# Patient Record
Sex: FEMALE | Race: WHITE | Employment: FULL TIME
[De-identification: names, ages, dates, MRNs, and addresses within clinical notes are randomized per-mention and may not be internally consistent; named-entity substitution may affect disease eponyms.]

---

## 2017-02-15 DIAGNOSIS — I10 ESSENTIAL HYPERTENSION: ICD-10-CM

## 2017-02-15 RX ORDER — VALSARTAN 80 MG/1
TABLET ORAL
Qty: 30 TABLET | Refills: 0 | Status: SHIPPED | OUTPATIENT
Start: 2017-02-15 | End: 2017-02-22 | Stop reason: SDUPTHER

## 2017-02-22 ENCOUNTER — OFFICE VISIT (OUTPATIENT)
Dept: FAMILY MEDICINE CLINIC | Facility: CLINIC | Age: 52
End: 2017-02-22

## 2017-02-22 VITALS — HEIGHT: 64 IN | DIASTOLIC BLOOD PRESSURE: 83 MMHG | SYSTOLIC BLOOD PRESSURE: 124 MMHG | HEART RATE: 71 BPM

## 2017-02-22 DIAGNOSIS — I10 ESSENTIAL HYPERTENSION: ICD-10-CM

## 2017-02-22 DIAGNOSIS — E03.9 ACQUIRED HYPOTHYROIDISM: Primary | ICD-10-CM

## 2017-02-22 PROCEDURE — G8427 DOCREV CUR MEDS BY ELIG CLIN: HCPCS | Performed by: FAMILY MEDICINE

## 2017-02-22 PROCEDURE — 1036F TOBACCO NON-USER: CPT | Performed by: FAMILY MEDICINE

## 2017-02-22 PROCEDURE — G8484 FLU IMMUNIZE NO ADMIN: HCPCS | Performed by: FAMILY MEDICINE

## 2017-02-22 PROCEDURE — 3014F SCREEN MAMMO DOC REV: CPT | Performed by: FAMILY MEDICINE

## 2017-02-22 PROCEDURE — G8419 CALC BMI OUT NRM PARAM NOF/U: HCPCS | Performed by: FAMILY MEDICINE

## 2017-02-22 PROCEDURE — 99214 OFFICE O/P EST MOD 30 MIN: CPT | Performed by: FAMILY MEDICINE

## 2017-02-22 RX ORDER — NEBIVOLOL 5 MG/1
TABLET ORAL
Qty: 90 TABLET | Refills: 1 | Status: SHIPPED | OUTPATIENT
Start: 2017-02-22 | End: 2017-07-26 | Stop reason: SDUPTHER

## 2017-02-22 RX ORDER — VALSARTAN 80 MG/1
TABLET ORAL
Qty: 90 TABLET | Refills: 1 | Status: SHIPPED | OUTPATIENT
Start: 2017-02-22 | End: 2017-07-26 | Stop reason: SDUPTHER

## 2017-04-11 ENCOUNTER — TELEPHONE (OUTPATIENT)
Dept: FAMILY MEDICINE CLINIC | Age: 52
End: 2017-04-11

## 2017-04-11 RX ORDER — MOMETASONE FUROATE 50 UG/1
2 SPRAY, METERED NASAL 2 TIMES DAILY
Qty: 1 INHALER | Refills: 11 | Status: SHIPPED | OUTPATIENT
Start: 2017-04-11 | End: 2018-06-16 | Stop reason: SDUPTHER

## 2017-05-31 ENCOUNTER — TELEPHONE (OUTPATIENT)
Dept: FAMILY MEDICINE CLINIC | Age: 52
End: 2017-05-31

## 2017-05-31 RX ORDER — AZELASTINE HYDROCHLORIDE, FLUTICASONE PROPIONATE 137; 50 UG/1; UG/1
1 SPRAY, METERED NASAL 2 TIMES DAILY
Qty: 1 BOTTLE | Refills: 11 | Status: SHIPPED | OUTPATIENT
Start: 2017-05-31 | End: 2018-10-23

## 2017-05-31 RX ORDER — LEVOCETIRIZINE DIHYDROCHLORIDE 5 MG/1
5 TABLET, FILM COATED ORAL DAILY
Qty: 30 TABLET | Refills: 11 | Status: SHIPPED | OUTPATIENT
Start: 2017-05-31 | End: 2018-10-23

## 2017-07-26 ENCOUNTER — OFFICE VISIT (OUTPATIENT)
Dept: FAMILY MEDICINE CLINIC | Age: 52
End: 2017-07-26
Payer: COMMERCIAL

## 2017-07-26 VITALS
HEIGHT: 64 IN | WEIGHT: 160 LBS | HEART RATE: 76 BPM | DIASTOLIC BLOOD PRESSURE: 86 MMHG | BODY MASS INDEX: 27.31 KG/M2 | RESPIRATION RATE: 16 BRPM | SYSTOLIC BLOOD PRESSURE: 132 MMHG

## 2017-07-26 DIAGNOSIS — Z13.9 SCREENING: ICD-10-CM

## 2017-07-26 DIAGNOSIS — Z00.00 WELL ADULT EXAM: Primary | ICD-10-CM

## 2017-07-26 DIAGNOSIS — I10 ESSENTIAL HYPERTENSION: ICD-10-CM

## 2017-07-26 PROCEDURE — 3014F SCREEN MAMMO DOC REV: CPT | Performed by: FAMILY MEDICINE

## 2017-07-26 PROCEDURE — 1036F TOBACCO NON-USER: CPT | Performed by: FAMILY MEDICINE

## 2017-07-26 PROCEDURE — 99214 OFFICE O/P EST MOD 30 MIN: CPT | Performed by: FAMILY MEDICINE

## 2017-07-26 PROCEDURE — 3017F COLORECTAL CA SCREEN DOC REV: CPT | Performed by: FAMILY MEDICINE

## 2017-07-26 PROCEDURE — G8419 CALC BMI OUT NRM PARAM NOF/U: HCPCS | Performed by: FAMILY MEDICINE

## 2017-07-26 PROCEDURE — G8427 DOCREV CUR MEDS BY ELIG CLIN: HCPCS | Performed by: FAMILY MEDICINE

## 2017-07-26 RX ORDER — NEBIVOLOL 5 MG/1
TABLET ORAL
Qty: 90 TABLET | Refills: 1 | Status: SHIPPED | OUTPATIENT
Start: 2017-07-26 | End: 2017-12-28 | Stop reason: SDUPTHER

## 2017-07-26 RX ORDER — VALSARTAN 80 MG/1
TABLET ORAL
Qty: 90 TABLET | Refills: 1 | Status: SHIPPED | OUTPATIENT
Start: 2017-07-26 | End: 2017-09-25 | Stop reason: SDUPTHER

## 2017-08-14 RX ORDER — LEVOTHYROXINE SODIUM 100 MCG
TABLET ORAL
Qty: 30 TABLET | Refills: 0 | Status: SHIPPED | OUTPATIENT
Start: 2017-08-14 | End: 2017-09-14 | Stop reason: SDUPTHER

## 2017-08-15 ENCOUNTER — OFFICE VISIT (OUTPATIENT)
Dept: FAMILY MEDICINE CLINIC | Age: 52
End: 2017-08-15
Payer: COMMERCIAL

## 2017-08-15 VITALS
WEIGHT: 160.05 LBS | HEIGHT: 64 IN | SYSTOLIC BLOOD PRESSURE: 138 MMHG | RESPIRATION RATE: 16 BRPM | DIASTOLIC BLOOD PRESSURE: 78 MMHG | BODY MASS INDEX: 27.33 KG/M2 | HEART RATE: 72 BPM

## 2017-08-15 DIAGNOSIS — J06.9 ACUTE URI: Primary | ICD-10-CM

## 2017-08-15 PROCEDURE — G8419 CALC BMI OUT NRM PARAM NOF/U: HCPCS | Performed by: FAMILY MEDICINE

## 2017-08-15 PROCEDURE — 99213 OFFICE O/P EST LOW 20 MIN: CPT | Performed by: FAMILY MEDICINE

## 2017-08-15 PROCEDURE — 3014F SCREEN MAMMO DOC REV: CPT | Performed by: FAMILY MEDICINE

## 2017-08-15 PROCEDURE — 1036F TOBACCO NON-USER: CPT | Performed by: FAMILY MEDICINE

## 2017-08-15 PROCEDURE — G8427 DOCREV CUR MEDS BY ELIG CLIN: HCPCS | Performed by: FAMILY MEDICINE

## 2017-08-15 PROCEDURE — 3017F COLORECTAL CA SCREEN DOC REV: CPT | Performed by: FAMILY MEDICINE

## 2017-08-15 ASSESSMENT — PATIENT HEALTH QUESTIONNAIRE - PHQ9
SUM OF ALL RESPONSES TO PHQ QUESTIONS 1-9: 0
SUM OF ALL RESPONSES TO PHQ9 QUESTIONS 1 & 2: 0
1. LITTLE INTEREST OR PLEASURE IN DOING THINGS: 0
2. FEELING DOWN, DEPRESSED OR HOPELESS: 0

## 2017-09-14 ENCOUNTER — HOSPITAL ENCOUNTER (OUTPATIENT)
Age: 52
Discharge: HOME | End: 2017-09-14
Payer: COMMERCIAL

## 2017-09-14 DIAGNOSIS — Z00.00 WELL ADULT EXAM: ICD-10-CM

## 2017-09-14 LAB
ABSOLUTE EOS #: 0.2 K/UL (ref 0–0.4)
ABSOLUTE LYMPH #: 1.4 K/UL (ref 1–4.8)
ABSOLUTE MONO #: 0.5 K/UL (ref 0.1–1.2)
ALBUMIN SERPL-MCNC: 4.2 G/DL (ref 3.5–5.2)
ALBUMIN/GLOBULIN RATIO: 1.3 (ref 1–2.5)
ALP BLD-CCNC: 81 U/L (ref 35–104)
ALT SERPL-CCNC: 12 U/L (ref 5–33)
ANION GAP SERPL CALCULATED.3IONS-SCNC: 12 MMOL/L (ref 9–17)
AST SERPL-CCNC: 17 U/L
BASOPHILS # BLD: 1 %
BASOPHILS ABSOLUTE: 0.1 K/UL (ref 0–0.2)
BILIRUB SERPL-MCNC: 0.24 MG/DL (ref 0.3–1.2)
BUN BLDV-MCNC: 14 MG/DL (ref 6–20)
BUN/CREAT BLD: ABNORMAL (ref 9–20)
CALCIUM SERPL-MCNC: 9.3 MG/DL (ref 8.6–10.4)
CHLORIDE BLD-SCNC: 101 MMOL/L (ref 98–107)
CHOLESTEROL/HDL RATIO: 2.3
CHOLESTEROL: 178 MG/DL
CO2: 25 MMOL/L (ref 20–31)
CREAT SERPL-MCNC: 0.61 MG/DL (ref 0.5–0.9)
DIFFERENTIAL TYPE: NORMAL
EOSINOPHILS RELATIVE PERCENT: 3 %
GFR AFRICAN AMERICAN: >60 ML/MIN
GFR NON-AFRICAN AMERICAN: >60 ML/MIN
GFR SERPL CREATININE-BSD FRML MDRD: ABNORMAL ML/MIN/{1.73_M2}
GFR SERPL CREATININE-BSD FRML MDRD: ABNORMAL ML/MIN/{1.73_M2}
GLUCOSE BLD-MCNC: 92 MG/DL (ref 70–99)
HCT VFR BLD CALC: 41.2 % (ref 36–46)
HDLC SERPL-MCNC: 79 MG/DL
HEMOGLOBIN: 14.1 G/DL (ref 12–16)
HIV AG/AB: NONREACTIVE
LDL CHOLESTEROL: 88 MG/DL (ref 0–130)
LYMPHOCYTES # BLD: 26 %
MCH RBC QN AUTO: 31.2 PG (ref 26–34)
MCHC RBC AUTO-ENTMCNC: 34.2 G/DL (ref 31–37)
MCV RBC AUTO: 91.2 FL (ref 80–100)
MONOCYTES # BLD: 10 %
PDW BLD-RTO: 13.7 % (ref 12.5–15.4)
PLATELET # BLD: 346 K/UL (ref 140–450)
PLATELET ESTIMATE: NORMAL
PMV BLD AUTO: 9.6 FL (ref 6–12)
POTASSIUM SERPL-SCNC: 4.9 MMOL/L (ref 3.7–5.3)
RBC # BLD: 4.52 M/UL (ref 4–5.2)
RBC # BLD: NORMAL 10*6/UL
SEG NEUTROPHILS: 60 %
SEGMENTED NEUTROPHILS ABSOLUTE COUNT: 3.4 K/UL (ref 1.8–7.7)
SODIUM BLD-SCNC: 138 MMOL/L (ref 135–144)
THYROXINE, FREE: 1.19 NG/DL (ref 0.93–1.7)
TOTAL PROTEIN: 7.5 G/DL (ref 6.4–8.3)
TRIGL SERPL-MCNC: 53 MG/DL
TSH SERPL DL<=0.05 MIU/L-ACNC: 0.39 MIU/L (ref 0.3–5)
VLDLC SERPL CALC-MCNC: NORMAL MG/DL (ref 1–30)
WBC # BLD: 5.6 K/UL (ref 3.5–11)
WBC # BLD: NORMAL 10*3/UL

## 2017-09-14 PROCEDURE — 80061 LIPID PANEL: CPT

## 2017-09-14 PROCEDURE — 84439 ASSAY OF FREE THYROXINE: CPT

## 2017-09-14 PROCEDURE — 87389 HIV-1 AG W/HIV-1&-2 AB AG IA: CPT

## 2017-09-14 PROCEDURE — 85025 COMPLETE CBC W/AUTO DIFF WBC: CPT

## 2017-09-14 PROCEDURE — 80053 COMPREHEN METABOLIC PANEL: CPT

## 2017-09-14 PROCEDURE — 84443 ASSAY THYROID STIM HORMONE: CPT

## 2017-09-14 PROCEDURE — 36415 COLL VENOUS BLD VENIPUNCTURE: CPT

## 2017-09-14 PROCEDURE — 86376 MICROSOMAL ANTIBODY EACH: CPT

## 2017-09-14 RX ORDER — LEVOTHYROXINE SODIUM 0.1 MG/1
TABLET ORAL
Qty: 30 TABLET | Refills: 0 | Status: CANCELLED | OUTPATIENT
Start: 2017-09-14

## 2017-09-14 RX ORDER — LEVOTHYROXINE SODIUM 0.1 MG/1
100 TABLET ORAL DAILY
Qty: 30 TABLET | Refills: 0 | Status: SHIPPED | OUTPATIENT
Start: 2017-09-14 | End: 2017-09-15 | Stop reason: SDUPTHER

## 2017-09-14 NOTE — TELEPHONE ENCOUNTER
All this patient and tell her I'm still waiting for her thyroid to come back as soon as it comes back today I will send in her dose of medicine I don't want to send in the wrong dose for her.   I have of forgot about her I keep checking for her labs

## 2017-09-15 LAB — THYROID PEROXIDASE (TPO) AB: 28.5 IU/ML (ref 0–35)

## 2017-09-15 RX ORDER — LEVOTHYROXINE SODIUM 0.1 MG/1
100 TABLET ORAL DAILY
Qty: 90 TABLET | Refills: 3 | Status: SHIPPED | OUTPATIENT
Start: 2017-09-15 | End: 2018-09-18 | Stop reason: SDUPTHER

## 2017-09-25 DIAGNOSIS — I10 ESSENTIAL HYPERTENSION: ICD-10-CM

## 2017-09-25 RX ORDER — VALSARTAN 80 MG/1
TABLET ORAL
Qty: 90 TABLET | Refills: 1 | Status: SHIPPED | OUTPATIENT
Start: 2017-09-25 | End: 2018-03-29 | Stop reason: SDUPTHER

## 2017-12-28 DIAGNOSIS — I10 ESSENTIAL HYPERTENSION: ICD-10-CM

## 2017-12-28 RX ORDER — NEBIVOLOL 5 MG/1
TABLET ORAL
Qty: 90 TABLET | Refills: 1 | Status: SHIPPED | OUTPATIENT
Start: 2017-12-28 | End: 2018-03-29 | Stop reason: SDUPTHER

## 2018-02-19 ENCOUNTER — OFFICE VISIT (OUTPATIENT)
Dept: FAMILY MEDICINE CLINIC | Age: 53
End: 2018-02-19
Payer: COMMERCIAL

## 2018-02-19 VITALS
RESPIRATION RATE: 16 BRPM | BODY MASS INDEX: 28.17 KG/M2 | HEART RATE: 62 BPM | SYSTOLIC BLOOD PRESSURE: 152 MMHG | WEIGHT: 165 LBS | DIASTOLIC BLOOD PRESSURE: 90 MMHG | HEIGHT: 64 IN

## 2018-02-19 DIAGNOSIS — J06.9 ACUTE URI: Primary | ICD-10-CM

## 2018-02-19 PROCEDURE — 1036F TOBACCO NON-USER: CPT | Performed by: FAMILY MEDICINE

## 2018-02-19 PROCEDURE — 3017F COLORECTAL CA SCREEN DOC REV: CPT | Performed by: FAMILY MEDICINE

## 2018-02-19 PROCEDURE — 99213 OFFICE O/P EST LOW 20 MIN: CPT | Performed by: FAMILY MEDICINE

## 2018-02-19 PROCEDURE — G8419 CALC BMI OUT NRM PARAM NOF/U: HCPCS | Performed by: FAMILY MEDICINE

## 2018-02-19 PROCEDURE — G8484 FLU IMMUNIZE NO ADMIN: HCPCS | Performed by: FAMILY MEDICINE

## 2018-02-19 PROCEDURE — G8427 DOCREV CUR MEDS BY ELIG CLIN: HCPCS | Performed by: FAMILY MEDICINE

## 2018-02-19 PROCEDURE — 3014F SCREEN MAMMO DOC REV: CPT | Performed by: FAMILY MEDICINE

## 2018-02-19 RX ORDER — AZITHROMYCIN 250 MG/1
TABLET, FILM COATED ORAL
Qty: 1 PACKET | Refills: 0 | Status: SHIPPED | OUTPATIENT
Start: 2018-02-19 | End: 2018-10-23

## 2018-02-19 ASSESSMENT — PATIENT HEALTH QUESTIONNAIRE - PHQ9
SUM OF ALL RESPONSES TO PHQ9 QUESTIONS 1 & 2: 0
1. LITTLE INTEREST OR PLEASURE IN DOING THINGS: 0
SUM OF ALL RESPONSES TO PHQ QUESTIONS 1-9: 0
2. FEELING DOWN, DEPRESSED OR HOPELESS: 0

## 2018-02-19 NOTE — PROGRESS NOTES
Value   09/14/2017 14     CREATININE (mg/dL)   Date Value   09/14/2017 0.61     Glucose (mg/dL)   Date Value   09/14/2017 92              Subjective:      HPI  She is here today complaining of having a cough congestion going on for the last week or so she is taking multiple over-the-counter various cold preparations which seem to be helping    Review of Systems:     Constitutional: Negative for fever, appetite change and fatigue. Family social and medical history reviewed and unchanged     HENT: Negative. Negative for nosebleeds, trouble swallowing and neck pain. Eyes: Negative for photophobia and visual disturbance. Respiratory: Negative. Negative for chest tightness and shortness of breath. Cardiovascular: Negative. Negative for chest pain and leg swelling. Gastrointestinal: Negative. Negative for abdominal pain and blood in stool. Endocrine: Negative for cold intolerance and polyuria. Genitourinary: Negative for dysuria and hematuria. Musculoskeletal: Negative. Skin: Negative for rash. Allergic/Immunologic: Negative. Neurological: Negative. Negative for dizziness, weakness and numbness. Hematological: Negative. Negative for adenopathy. Does not bruise/bleed easily. Psychiatric/Behavioral: Negative for sleep disturbance, dysphoric mood and  decreased concentration. The patient is not nervous/anxious. Objective:     Physical Exam:     Nursing note and vitals reviewed. BP (!) 152/90   Pulse 62   Resp 16   Ht 5' 4.17\" (1.63 m)   Wt 165 lb (74.8 kg)   Breastfeeding? No   BMI 28.17 kg/m²   Constitutional: She is oriented to person, place, and time. She   appears well-developed and well-nourished. HENT:   Head: Normocephalic and atraumatic. Right Ear: External ear normal. Tympanic membrane is not erythematous. No middle ear effusion. Left Ear: External ear normal. Tympanic membrane is not erythematous. No middle ear effusion. Nose: No mucosal edema.

## 2018-03-29 DIAGNOSIS — I10 ESSENTIAL HYPERTENSION: ICD-10-CM

## 2018-03-29 RX ORDER — VALSARTAN 80 MG/1
TABLET ORAL
Qty: 90 TABLET | Refills: 1 | Status: SHIPPED | OUTPATIENT
Start: 2018-03-29 | End: 2018-10-23

## 2018-03-29 RX ORDER — NEBIVOLOL 5 MG/1
TABLET ORAL
Qty: 90 TABLET | Refills: 1 | Status: SHIPPED | OUTPATIENT
Start: 2018-03-29 | End: 2018-10-23 | Stop reason: SDUPTHER

## 2018-06-18 RX ORDER — MOMETASONE FUROATE 50 UG/1
SPRAY, METERED NASAL
Qty: 1 INHALER | Refills: 9 | Status: SHIPPED | OUTPATIENT
Start: 2018-06-18 | End: 2019-07-03 | Stop reason: SDUPTHER

## 2018-10-17 RX ORDER — LEVOTHYROXINE SODIUM 100 MCG
TABLET ORAL
Qty: 30 TABLET | Refills: 0 | Status: SHIPPED | OUTPATIENT
Start: 2018-10-17 | End: 2018-11-19 | Stop reason: SDUPTHER

## 2018-10-23 ENCOUNTER — OFFICE VISIT (OUTPATIENT)
Dept: FAMILY MEDICINE CLINIC | Age: 53
End: 2018-10-23
Payer: COMMERCIAL

## 2018-10-23 VITALS
WEIGHT: 165 LBS | RESPIRATION RATE: 16 BRPM | BODY MASS INDEX: 28.17 KG/M2 | SYSTOLIC BLOOD PRESSURE: 154 MMHG | DIASTOLIC BLOOD PRESSURE: 89 MMHG | HEIGHT: 64 IN | HEART RATE: 76 BPM

## 2018-10-23 DIAGNOSIS — I10 ESSENTIAL HYPERTENSION: Primary | ICD-10-CM

## 2018-10-23 DIAGNOSIS — E03.9 ACQUIRED HYPOTHYROIDISM: ICD-10-CM

## 2018-10-23 DIAGNOSIS — Z12.11 SCREENING FOR COLON CANCER: ICD-10-CM

## 2018-10-23 DIAGNOSIS — J30.1 SEASONAL ALLERGIC RHINITIS DUE TO POLLEN: ICD-10-CM

## 2018-10-23 DIAGNOSIS — Z00.00 WELL ADULT EXAM: ICD-10-CM

## 2018-10-23 PROCEDURE — 3017F COLORECTAL CA SCREEN DOC REV: CPT | Performed by: FAMILY MEDICINE

## 2018-10-23 PROCEDURE — G8427 DOCREV CUR MEDS BY ELIG CLIN: HCPCS | Performed by: FAMILY MEDICINE

## 2018-10-23 PROCEDURE — G8419 CALC BMI OUT NRM PARAM NOF/U: HCPCS | Performed by: FAMILY MEDICINE

## 2018-10-23 PROCEDURE — G8484 FLU IMMUNIZE NO ADMIN: HCPCS | Performed by: FAMILY MEDICINE

## 2018-10-23 PROCEDURE — 1036F TOBACCO NON-USER: CPT | Performed by: FAMILY MEDICINE

## 2018-10-23 PROCEDURE — 99214 OFFICE O/P EST MOD 30 MIN: CPT | Performed by: FAMILY MEDICINE

## 2018-10-23 RX ORDER — LOSARTAN POTASSIUM 100 MG/1
100 TABLET ORAL DAILY
Qty: 30 TABLET | Refills: 5 | Status: SHIPPED | OUTPATIENT
Start: 2018-10-23 | End: 2019-04-28 | Stop reason: SDUPTHER

## 2018-10-23 RX ORDER — NEBIVOLOL 5 MG/1
TABLET ORAL
Qty: 90 TABLET | Refills: 1 | Status: SHIPPED | OUTPATIENT
Start: 2018-10-23 | End: 2019-04-28 | Stop reason: SDUPTHER

## 2018-10-23 ASSESSMENT — PATIENT HEALTH QUESTIONNAIRE - PHQ9
SUM OF ALL RESPONSES TO PHQ QUESTIONS 1-9: 0
SUM OF ALL RESPONSES TO PHQ9 QUESTIONS 1 & 2: 0
1. LITTLE INTEREST OR PLEASURE IN DOING THINGS: 0
SUM OF ALL RESPONSES TO PHQ QUESTIONS 1-9: 0
2. FEELING DOWN, DEPRESSED OR HOPELESS: 0

## 2018-11-16 ENCOUNTER — HOSPITAL ENCOUNTER (OUTPATIENT)
Age: 53
Discharge: HOME | End: 2018-11-16
Payer: COMMERCIAL

## 2018-11-16 DIAGNOSIS — E03.9 ACQUIRED HYPOTHYROIDISM: ICD-10-CM

## 2018-11-16 DIAGNOSIS — J30.1 SEASONAL ALLERGIC RHINITIS DUE TO POLLEN: ICD-10-CM

## 2018-11-16 DIAGNOSIS — Z00.00 WELL ADULT EXAM: ICD-10-CM

## 2018-11-16 DIAGNOSIS — I10 ESSENTIAL HYPERTENSION: ICD-10-CM

## 2018-11-16 LAB
ABSOLUTE EOS #: 0.19 K/UL (ref 0–0.44)
ABSOLUTE IMMATURE GRANULOCYTE: <0.03 K/UL (ref 0–0.3)
ABSOLUTE LYMPH #: 2 K/UL (ref 1.1–3.7)
ABSOLUTE MONO #: 0.8 K/UL (ref 0.1–1.2)
ALBUMIN SERPL-MCNC: 4.4 G/DL (ref 3.5–5.2)
ALBUMIN/GLOBULIN RATIO: 1.4 (ref 1–2.5)
ALP BLD-CCNC: 74 U/L (ref 35–104)
ALT SERPL-CCNC: 22 U/L (ref 5–33)
ANION GAP SERPL CALCULATED.3IONS-SCNC: 12 MMOL/L (ref 9–17)
AST SERPL-CCNC: 21 U/L
BASOPHILS # BLD: 1 % (ref 0–2)
BASOPHILS ABSOLUTE: 0.07 K/UL (ref 0–0.2)
BILIRUB SERPL-MCNC: 0.26 MG/DL (ref 0.3–1.2)
BUN BLDV-MCNC: 20 MG/DL (ref 6–20)
BUN/CREAT BLD: ABNORMAL (ref 9–20)
CALCIUM SERPL-MCNC: 9.6 MG/DL (ref 8.6–10.4)
CHLORIDE BLD-SCNC: 104 MMOL/L (ref 98–107)
CHOLESTEROL/HDL RATIO: 2.1
CHOLESTEROL: 187 MG/DL
CO2: 24 MMOL/L (ref 20–31)
CREAT SERPL-MCNC: 0.72 MG/DL (ref 0.5–0.9)
DIFFERENTIAL TYPE: NORMAL
EOSINOPHILS RELATIVE PERCENT: 3 % (ref 1–4)
GFR AFRICAN AMERICAN: >60 ML/MIN
GFR NON-AFRICAN AMERICAN: >60 ML/MIN
GFR SERPL CREATININE-BSD FRML MDRD: ABNORMAL ML/MIN/{1.73_M2}
GFR SERPL CREATININE-BSD FRML MDRD: ABNORMAL ML/MIN/{1.73_M2}
GLUCOSE BLD-MCNC: 94 MG/DL (ref 70–99)
HCT VFR BLD CALC: 41.3 % (ref 36.3–47.1)
HDLC SERPL-MCNC: 88 MG/DL
HEMOGLOBIN: 13.6 G/DL (ref 11.9–15.1)
IMMATURE GRANULOCYTES: 0 %
LDL CHOLESTEROL: 87 MG/DL (ref 0–130)
LYMPHOCYTES # BLD: 28 % (ref 24–43)
MCH RBC QN AUTO: 30.8 PG (ref 25.2–33.5)
MCHC RBC AUTO-ENTMCNC: 32.9 G/DL (ref 28.4–34.8)
MCV RBC AUTO: 93.7 FL (ref 82.6–102.9)
MONOCYTES # BLD: 11 % (ref 3–12)
NRBC AUTOMATED: 0 PER 100 WBC
PDW BLD-RTO: 13 % (ref 11.8–14.4)
PLATELET # BLD: 318 K/UL (ref 138–453)
PLATELET ESTIMATE: NORMAL
PMV BLD AUTO: 11.2 FL (ref 8.1–13.5)
POTASSIUM SERPL-SCNC: 4.4 MMOL/L (ref 3.7–5.3)
RBC # BLD: 4.41 M/UL (ref 3.95–5.11)
RBC # BLD: NORMAL 10*6/UL
SEG NEUTROPHILS: 57 % (ref 36–65)
SEGMENTED NEUTROPHILS ABSOLUTE COUNT: 4.19 K/UL (ref 1.5–8.1)
SODIUM BLD-SCNC: 140 MMOL/L (ref 135–144)
THYROID PEROXIDASE (TPO) AB: <10 IU/ML (ref 0–35)
THYROXINE, FREE: 1.2 NG/DL (ref 0.93–1.7)
TOTAL PROTEIN: 7.5 G/DL (ref 6.4–8.3)
TRIGL SERPL-MCNC: 61 MG/DL
TSH SERPL DL<=0.05 MIU/L-ACNC: 0.13 MIU/L (ref 0.3–5)
VITAMIN D 25-HYDROXY: 37.7 NG/ML (ref 30–100)
VLDLC SERPL CALC-MCNC: NORMAL MG/DL (ref 1–30)
WBC # BLD: 7.3 K/UL (ref 3.5–11.3)
WBC # BLD: NORMAL 10*3/UL

## 2018-11-16 PROCEDURE — 80061 LIPID PANEL: CPT

## 2018-11-16 PROCEDURE — 84443 ASSAY THYROID STIM HORMONE: CPT

## 2018-11-16 PROCEDURE — 85025 COMPLETE CBC W/AUTO DIFF WBC: CPT

## 2018-11-16 PROCEDURE — 86376 MICROSOMAL ANTIBODY EACH: CPT

## 2018-11-16 PROCEDURE — 80053 COMPREHEN METABOLIC PANEL: CPT

## 2018-11-16 PROCEDURE — 36415 COLL VENOUS BLD VENIPUNCTURE: CPT

## 2018-11-16 PROCEDURE — 84439 ASSAY OF FREE THYROXINE: CPT

## 2018-11-16 PROCEDURE — 82306 VITAMIN D 25 HYDROXY: CPT

## 2018-11-19 RX ORDER — LEVOTHYROXINE SODIUM 100 MCG
TABLET ORAL
Qty: 90 TABLET | Refills: 3 | Status: SHIPPED | OUTPATIENT
Start: 2018-11-19 | End: 2021-11-30 | Stop reason: SDUPTHER

## 2019-05-02 ENCOUNTER — OFFICE VISIT (OUTPATIENT)
Dept: FAMILY MEDICINE CLINIC | Age: 54
End: 2019-05-02
Payer: COMMERCIAL

## 2019-05-02 VITALS
DIASTOLIC BLOOD PRESSURE: 89 MMHG | HEART RATE: 71 BPM | BODY MASS INDEX: 28.17 KG/M2 | RESPIRATION RATE: 16 BRPM | SYSTOLIC BLOOD PRESSURE: 143 MMHG | HEIGHT: 64 IN

## 2019-05-02 DIAGNOSIS — E03.9 ACQUIRED HYPOTHYROIDISM: ICD-10-CM

## 2019-05-02 DIAGNOSIS — I10 ESSENTIAL HYPERTENSION: Primary | ICD-10-CM

## 2019-05-02 PROCEDURE — 3017F COLORECTAL CA SCREEN DOC REV: CPT | Performed by: FAMILY MEDICINE

## 2019-05-02 PROCEDURE — G8419 CALC BMI OUT NRM PARAM NOF/U: HCPCS | Performed by: FAMILY MEDICINE

## 2019-05-02 PROCEDURE — G8427 DOCREV CUR MEDS BY ELIG CLIN: HCPCS | Performed by: FAMILY MEDICINE

## 2019-05-02 PROCEDURE — 99213 OFFICE O/P EST LOW 20 MIN: CPT | Performed by: FAMILY MEDICINE

## 2019-05-02 PROCEDURE — 1036F TOBACCO NON-USER: CPT | Performed by: FAMILY MEDICINE

## 2019-05-02 RX ORDER — OLMESARTAN MEDOXOMIL 40 MG/1
40 TABLET ORAL DAILY
Qty: 30 TABLET | Refills: 3 | Status: SHIPPED | OUTPATIENT
Start: 2019-05-02 | End: 2019-09-23 | Stop reason: SDUPTHER

## 2019-05-02 RX ORDER — NEBIVOLOL 10 MG/1
10 TABLET ORAL DAILY
Qty: 90 TABLET | Refills: 1 | Status: SHIPPED | OUTPATIENT
Start: 2019-05-02 | End: 2019-08-26 | Stop reason: SDUPTHER

## 2019-05-02 RX ORDER — NEBIVOLOL 10 MG/1
10 TABLET ORAL DAILY
Qty: 30 TABLET | Refills: 3 | Status: SHIPPED | OUTPATIENT
Start: 2019-05-02 | End: 2019-05-02 | Stop reason: SDUPTHER

## 2019-05-02 ASSESSMENT — PATIENT HEALTH QUESTIONNAIRE - PHQ9
2. FEELING DOWN, DEPRESSED OR HOPELESS: 0
1. LITTLE INTEREST OR PLEASURE IN DOING THINGS: 0
SUM OF ALL RESPONSES TO PHQ9 QUESTIONS 1 & 2: 0
SUM OF ALL RESPONSES TO PHQ QUESTIONS 1-9: 0
SUM OF ALL RESPONSES TO PHQ QUESTIONS 1-9: 0

## 2019-07-05 RX ORDER — MOMETASONE FUROATE 50 UG/1
SPRAY, METERED NASAL
Qty: 1 EACH | Refills: 11 | Status: SHIPPED | OUTPATIENT
Start: 2019-07-05 | End: 2020-08-24

## 2019-08-07 ENCOUNTER — HOSPITAL ENCOUNTER (OUTPATIENT)
Age: 54
Setting detail: SPECIMEN
Discharge: HOME | End: 2019-08-07
Payer: COMMERCIAL

## 2019-08-07 ENCOUNTER — OFFICE VISIT (OUTPATIENT)
Dept: FAMILY MEDICINE CLINIC | Age: 54
End: 2019-08-07
Payer: COMMERCIAL

## 2019-08-07 VITALS — HEART RATE: 68 BPM | SYSTOLIC BLOOD PRESSURE: 137 MMHG | OXYGEN SATURATION: 99 % | DIASTOLIC BLOOD PRESSURE: 84 MMHG

## 2019-08-07 DIAGNOSIS — E03.9 ACQUIRED HYPOTHYROIDISM: ICD-10-CM

## 2019-08-07 DIAGNOSIS — I10 ESSENTIAL HYPERTENSION: ICD-10-CM

## 2019-08-07 DIAGNOSIS — E03.9 ACQUIRED HYPOTHYROIDISM: Primary | ICD-10-CM

## 2019-08-07 DIAGNOSIS — R00.2 PALPITATION: ICD-10-CM

## 2019-08-07 LAB
THYROXINE, FREE: 1.75 NG/DL (ref 0.93–1.7)
TSH SERPL DL<=0.05 MIU/L-ACNC: 0.11 MIU/L (ref 0.3–5)

## 2019-08-07 PROCEDURE — G8427 DOCREV CUR MEDS BY ELIG CLIN: HCPCS | Performed by: FAMILY MEDICINE

## 2019-08-07 PROCEDURE — 1036F TOBACCO NON-USER: CPT | Performed by: FAMILY MEDICINE

## 2019-08-07 PROCEDURE — G8419 CALC BMI OUT NRM PARAM NOF/U: HCPCS | Performed by: FAMILY MEDICINE

## 2019-08-07 PROCEDURE — 3017F COLORECTAL CA SCREEN DOC REV: CPT | Performed by: FAMILY MEDICINE

## 2019-08-07 PROCEDURE — 99213 OFFICE O/P EST LOW 20 MIN: CPT | Performed by: FAMILY MEDICINE

## 2019-08-07 ASSESSMENT — PATIENT HEALTH QUESTIONNAIRE - PHQ9
2. FEELING DOWN, DEPRESSED OR HOPELESS: 0
SUM OF ALL RESPONSES TO PHQ QUESTIONS 1-9: 0
1. LITTLE INTEREST OR PLEASURE IN DOING THINGS: 0
SUM OF ALL RESPONSES TO PHQ9 QUESTIONS 1 & 2: 0
SUM OF ALL RESPONSES TO PHQ QUESTIONS 1-9: 0

## 2019-08-09 DIAGNOSIS — E03.9 ACQUIRED HYPOTHYROIDISM: Primary | ICD-10-CM

## 2019-08-09 RX ORDER — LEVOTHYROXINE SODIUM 88 UG/1
88 TABLET ORAL DAILY
Qty: 90 TABLET | Refills: 0 | Status: SHIPPED | OUTPATIENT
Start: 2019-08-09 | End: 2019-11-04 | Stop reason: SDUPTHER

## 2019-08-26 ENCOUNTER — TELEPHONE (OUTPATIENT)
Dept: FAMILY MEDICINE CLINIC | Age: 54
End: 2019-08-26

## 2019-08-26 DIAGNOSIS — I10 ESSENTIAL HYPERTENSION: ICD-10-CM

## 2019-08-26 RX ORDER — NEBIVOLOL 20 MG/1
20 TABLET ORAL DAILY
Qty: 90 TABLET | Refills: 1 | Status: SHIPPED | OUTPATIENT
Start: 2019-08-26 | End: 2020-02-25

## 2019-09-23 DIAGNOSIS — I10 ESSENTIAL HYPERTENSION: ICD-10-CM

## 2019-09-23 RX ORDER — OLMESARTAN MEDOXOMIL 40 MG/1
TABLET ORAL
Qty: 30 TABLET | Refills: 2 | Status: SHIPPED | OUTPATIENT
Start: 2019-09-23 | End: 2020-03-24

## 2019-11-04 DIAGNOSIS — E03.9 ACQUIRED HYPOTHYROIDISM: ICD-10-CM

## 2019-11-04 RX ORDER — LEVOTHYROXINE SODIUM 88 MCG
TABLET ORAL
Qty: 30 TABLET | Refills: 0 | Status: SHIPPED | OUTPATIENT
Start: 2019-11-04 | End: 2019-12-05 | Stop reason: SDUPTHER

## 2019-12-03 ENCOUNTER — HOSPITAL ENCOUNTER (OUTPATIENT)
Age: 54
Discharge: HOME | End: 2019-12-03
Payer: COMMERCIAL

## 2019-12-03 DIAGNOSIS — E03.9 ACQUIRED HYPOTHYROIDISM: ICD-10-CM

## 2019-12-03 PROCEDURE — 84443 ASSAY THYROID STIM HORMONE: CPT

## 2019-12-03 PROCEDURE — 84439 ASSAY OF FREE THYROXINE: CPT

## 2019-12-03 PROCEDURE — 36415 COLL VENOUS BLD VENIPUNCTURE: CPT

## 2019-12-04 LAB
THYROXINE, FREE: 1.42 NG/DL (ref 0.93–1.7)
TSH SERPL DL<=0.05 MIU/L-ACNC: 0.23 MIU/L (ref 0.3–5)

## 2019-12-05 ENCOUNTER — TELEPHONE (OUTPATIENT)
Dept: FAMILY MEDICINE CLINIC | Age: 54
End: 2019-12-05

## 2019-12-05 DIAGNOSIS — E03.9 ACQUIRED HYPOTHYROIDISM: ICD-10-CM

## 2019-12-05 RX ORDER — LEVOTHYROXINE SODIUM 88 UG/1
TABLET ORAL
Qty: 90 TABLET | Refills: 3 | Status: SHIPPED | OUTPATIENT
Start: 2019-12-05 | End: 2020-12-02

## 2020-02-25 RX ORDER — NEBIVOLOL HYDROCHLORIDE 20 MG/1
TABLET ORAL
Qty: 30 TABLET | Refills: 0 | Status: SHIPPED | OUTPATIENT
Start: 2020-02-25 | End: 2020-03-24

## 2020-03-24 RX ORDER — OLMESARTAN MEDOXOMIL 40 MG/1
TABLET ORAL
Qty: 30 TABLET | Refills: 1 | Status: SHIPPED | OUTPATIENT
Start: 2020-03-24 | End: 2020-07-28

## 2020-03-24 RX ORDER — NEBIVOLOL HYDROCHLORIDE 20 MG/1
TABLET ORAL
Qty: 30 TABLET | Refills: 0 | Status: SHIPPED | OUTPATIENT
Start: 2020-03-24 | End: 2020-04-24

## 2020-04-24 RX ORDER — NEBIVOLOL HYDROCHLORIDE 20 MG/1
TABLET ORAL
Qty: 30 TABLET | Refills: 0 | Status: SHIPPED | OUTPATIENT
Start: 2020-04-24 | End: 2020-05-28

## 2020-05-28 RX ORDER — NEBIVOLOL HYDROCHLORIDE 20 MG/1
TABLET ORAL
Qty: 30 TABLET | Refills: 0 | Status: SHIPPED | OUTPATIENT
Start: 2020-05-28 | End: 2020-06-29

## 2020-06-29 RX ORDER — NEBIVOLOL HYDROCHLORIDE 20 MG/1
TABLET ORAL
Qty: 30 TABLET | Refills: 0 | Status: SHIPPED | OUTPATIENT
Start: 2020-06-29 | End: 2020-07-28

## 2020-07-28 RX ORDER — OLMESARTAN MEDOXOMIL 40 MG/1
TABLET ORAL
Qty: 30 TABLET | Refills: 0 | Status: SHIPPED | OUTPATIENT
Start: 2020-07-28 | End: 2020-09-29

## 2020-07-28 RX ORDER — NEBIVOLOL HYDROCHLORIDE 20 MG/1
TABLET ORAL
Qty: 30 TABLET | Refills: 0 | Status: SHIPPED | OUTPATIENT
Start: 2020-07-28 | End: 2020-08-31

## 2020-07-28 NOTE — TELEPHONE ENCOUNTER
Anshu Aleman is calling to request a refill on the following medication(s):    Last Visit Date (If Applicable):  4/5/1683    Next Visit Date:    Visit date not found    Medication Request:  Requested Prescriptions     Pending Prescriptions Disp Refills    BYSTOLIC 20 MG TABS tablet [Pharmacy Med Name: BYSTOLIC 20 MG TABLET] 30 tablet 0     Sig: TAKE ONE TABLET BY MOUTH DAILY    olmesartan (BENICAR) 40 MG tablet [Pharmacy Med Name: OLMESARTAN MEDOXOMIL 40 MG TAB] 30 tablet 0     Sig: TAKE ONE TABLET BY MOUTH DAILY

## 2020-08-24 RX ORDER — MOMETASONE FUROATE 50 UG/1
SPRAY, METERED NASAL
Qty: 1 INHALER | Refills: 10 | Status: SHIPPED | OUTPATIENT
Start: 2020-08-24 | End: 2021-08-27

## 2020-08-24 NOTE — TELEPHONE ENCOUNTER
Nirmala Saucedo is calling to request a refill on the following medication(s):    Last Visit Date (If Applicable):  7/4/5310    Next Visit Date:    Visit date not found    Medication Request:  Requested Prescriptions     Pending Prescriptions Disp Refills    mometasone (NASONEX) 50 MCG/ACT nasal spray [Pharmacy Med Name: MOMETASONE FUROATE 50 MCG SPRY] 1 Inhaler 10     Sig: SPRAY TWO SPRAYS IN EACH NOSTRIL TWICE DAILY

## 2020-08-31 RX ORDER — NEBIVOLOL HYDROCHLORIDE 20 MG/1
TABLET ORAL
Qty: 30 TABLET | Refills: 0 | Status: SHIPPED | OUTPATIENT
Start: 2020-08-31 | End: 2020-09-29

## 2020-10-28 ENCOUNTER — OFFICE VISIT (OUTPATIENT)
Dept: FAMILY MEDICINE CLINIC | Age: 55
End: 2020-10-28
Payer: COMMERCIAL

## 2020-10-28 VITALS
HEIGHT: 63 IN | WEIGHT: 160 LBS | SYSTOLIC BLOOD PRESSURE: 173 MMHG | HEART RATE: 70 BPM | DIASTOLIC BLOOD PRESSURE: 93 MMHG | OXYGEN SATURATION: 99 % | BODY MASS INDEX: 28.35 KG/M2 | TEMPERATURE: 96.3 F

## 2020-10-28 PROCEDURE — G8482 FLU IMMUNIZE ORDER/ADMIN: HCPCS | Performed by: FAMILY MEDICINE

## 2020-10-28 PROCEDURE — 90715 TDAP VACCINE 7 YRS/> IM: CPT | Performed by: FAMILY MEDICINE

## 2020-10-28 PROCEDURE — 90472 IMMUNIZATION ADMIN EACH ADD: CPT | Performed by: FAMILY MEDICINE

## 2020-10-28 PROCEDURE — 99396 PREV VISIT EST AGE 40-64: CPT | Performed by: FAMILY MEDICINE

## 2020-10-28 PROCEDURE — 90686 IIV4 VACC NO PRSV 0.5 ML IM: CPT | Performed by: FAMILY MEDICINE

## 2020-10-28 PROCEDURE — 90471 IMMUNIZATION ADMIN: CPT | Performed by: FAMILY MEDICINE

## 2020-10-28 SDOH — ECONOMIC STABILITY: INCOME INSECURITY: HOW HARD IS IT FOR YOU TO PAY FOR THE VERY BASICS LIKE FOOD, HOUSING, MEDICAL CARE, AND HEATING?: NOT HARD AT ALL

## 2020-10-28 SDOH — ECONOMIC STABILITY: FOOD INSECURITY: WITHIN THE PAST 12 MONTHS, YOU WORRIED THAT YOUR FOOD WOULD RUN OUT BEFORE YOU GOT MONEY TO BUY MORE.: NEVER TRUE

## 2020-10-28 SDOH — ECONOMIC STABILITY: FOOD INSECURITY: WITHIN THE PAST 12 MONTHS, THE FOOD YOU BOUGHT JUST DIDN'T LAST AND YOU DIDN'T HAVE MONEY TO GET MORE.: NEVER TRUE

## 2020-10-28 ASSESSMENT — PATIENT HEALTH QUESTIONNAIRE - PHQ9
SUM OF ALL RESPONSES TO PHQ QUESTIONS 1-9: 0
SUM OF ALL RESPONSES TO PHQ9 QUESTIONS 1 & 2: 0
SUM OF ALL RESPONSES TO PHQ QUESTIONS 1-9: 0
1. LITTLE INTEREST OR PLEASURE IN DOING THINGS: 0
2. FEELING DOWN, DEPRESSED OR HOPELESS: 0
SUM OF ALL RESPONSES TO PHQ QUESTIONS 1-9: 0

## 2020-10-28 NOTE — PROGRESS NOTES
[facility]  [address] [place] [address] [address] [facility] [address]  Dept: [phone]      Jacqueline Leija is a 48 y.o. female who presents today for follow up on her  medical conditions as noted below. Chief Complaint   Patient presents with    Medication Refill       Patient Active Problem List:     Hypothyroidism     Allergic rhinitis     Past Medical History:   Diagnosis Date    Allergic rhinitis     Hypertension     Hypothyroidism       Past Surgical History:   Procedure Laterality Date    BREAST SURGERY       No family history on file. Current Outpatient Medications   Medication Sig Dispense Refill    BYSTOLIC 20 MG TABS tablet TAKE ONE TABLET BY MOUTH DAILY 30 tablet 0    olmesartan (BENICAR) 40 MG tablet TAKE ONE TABLET BY MOUTH DAILY (Patient taking differently: 20 mg ) 30 tablet 0    mometasone (NASONEX) 50 MCG/ACT nasal spray SPRAY TWO SPRAYS IN EACH NOSTRIL TWICE DAILY 1 Inhaler 10    levothyroxine (SYNTHROID) 88 MCG tablet TAKE ONE TABLET BY MOUTH EVERY MORNING ON AN EMPTY STOMACH WITH WATER. WAIT 30 MINUTES BEFORE EATING OR TAKING OTHER MEDS 90 tablet 3    ALPRAZolam (XANAX) 1 MG tablet   0    valACYclovir (VALTREX) 500 MG tablet       nebivolol (BYSTOLIC) 5 MG tablet TAKE ONE TABLET BY MOUTH DAILY (Patient not taking: Reported on 10/28/2020) 30 tablet 0    SYNTHROID 100 MCG tablet TAKE ONE TABLET BY MOUTH DAILY (Patient not taking: Reported on 10/28/2020) 90 tablet 3     No current facility-administered medications for this visit.       ALLERGIES:    Allergies   Allergen Reactions    Seasonal        Social History     Tobacco Use    Smoking status: Never Smoker    Smokeless tobacco: Never Used   Substance Use Topics    Alcohol use: Yes     Comment: socially        LDL Cholesterol (mg/dL)   Date Value   11/16/2018 87     HDL (mg/dL)   Date Value   11/16/2018 88     BUN (mg/dL)   Date Value   11/16/2018 20     CREATININE (mg/dL) Date Value   11/16/2018 0.72     Glucose (mg/dL)   Date Value   11/16/2018 94              Subjective:      HPI  She is here today for a well visit follow-up on high blood pressure hypothyroidism she is due for laboratory studies her blood pressure is quite high but she states she is extremely nervous for whatever reason coming in to doctor's offices just freaks her out she states overall she is feeling fine    Review of Systems:     Constitutional: Negative for fever, appetite change and fatigue. Family social and medical history reviewed and unchanged     HENT: Negative. Negative for nosebleeds, trouble swallowing and neck pain. Eyes: Negative for photophobia and visual disturbance. Respiratory: Negative. Negative for chest tightness and shortness of breath. Cardiovascular: Negative. Negative for chest pain and leg swelling. Gastrointestinal: Negative. Negative for abdominal pain and blood in stool. Endocrine: Negative for cold intolerance and polyuria. Genitourinary: Negative for dysuria and hematuria. Musculoskeletal: Negative. Skin: Negative for rash. Allergic/Immunologic: Negative. Neurological: Negative. Negative for dizziness, weakness and numbness. Hematological: Negative. Negative for adenopathy. Does not bruise/bleed easily. Psychiatric/Behavioral: Negative for sleep disturbance, dysphoric mood and  decreased concentration. The patient is not nervous/anxious. Objective:     Physical Exam:     Nursing note and vitals reviewed. BP (!) 173/93   Pulse 70   Temp 96.3 °F (35.7 °C)   Ht 5' 3\" (1.6 m)   Wt 160 lb (72.6 kg)   SpO2 99%   BMI 28.34 kg/m²   Constitutional: She is oriented to person, place, and time. She   appears well-developed and well-nourished. HENT:   Head: Normocephalic and atraumatic. Right Ear: External ear normal. Tympanic membrane is not erythematous. No middle ear effusion.     Left Ear: External ear normal. Tympanic membrane is 4/28/2021    Comprehensive Metabolic Panel     Standing Status:   Future     Standing Expiration Date:   4/28/2021    Lipid Panel     Standing Status:   Future     Standing Expiration Date:   4/28/2021     Order Specific Question:   Is Patient Fasting?/# of Hours     Answer:   yes    T4, Free     Standing Status:   Future     Standing Expiration Date:   4/28/2021    TSH without Reflex     Standing Status:   Future     Standing Expiration Date:   4/28/2021    Vitamin D 25 Hydroxy     Standing Status:   Future     Standing Expiration Date:   10/28/2021     No orders of the defined types were placed in this encounter.   Given influenza and Tdap  Electronically signed by Emir Barroso DO on 10/28/2020 at 5:03 PM

## 2020-10-28 NOTE — PROGRESS NOTES
Vaccine Information Sheet, \"Influenza - Inactivated\"  given to Sheridan Melvin, or parent/legal guardian [facility]  [facility] and verbalized understanding. Patient responses:    Have you ever had a reaction to a flu vaccine? No  Do you have any current illness? No  Have you ever had Guillian [place] Syndrome? No  Do you have a serious allergy to any of the following: Neomycin, Polymyxin, Thimerosal, eggs or egg products? No    Flu vaccine given per order. Please see immunization tab. Risks and benefits explained. Current VIS given.

## 2020-12-30 ENCOUNTER — TELEPHONE (OUTPATIENT)
Dept: FAMILY MEDICINE CLINIC | Age: 55
End: 2020-12-30

## 2020-12-30 NOTE — TELEPHONE ENCOUNTER
Pt blood pressure has been running in 150/90 since she was seen in Oct and wants to know what to do. Pt states she takes olmesartan 40mg 1/2 tab qd and Bystolic 16FW qd.

## 2021-03-04 ENCOUNTER — TELEMEDICINE (OUTPATIENT)
Dept: FAMILY MEDICINE CLINIC | Age: 56
End: 2021-03-04
Payer: COMMERCIAL

## 2021-03-04 DIAGNOSIS — I10 ESSENTIAL HYPERTENSION: Primary | ICD-10-CM

## 2021-03-04 PROCEDURE — G8419 CALC BMI OUT NRM PARAM NOF/U: HCPCS | Performed by: FAMILY MEDICINE

## 2021-03-04 PROCEDURE — G8482 FLU IMMUNIZE ORDER/ADMIN: HCPCS | Performed by: FAMILY MEDICINE

## 2021-03-04 PROCEDURE — G8427 DOCREV CUR MEDS BY ELIG CLIN: HCPCS | Performed by: FAMILY MEDICINE

## 2021-03-04 PROCEDURE — 1036F TOBACCO NON-USER: CPT | Performed by: FAMILY MEDICINE

## 2021-03-04 PROCEDURE — 99213 OFFICE O/P EST LOW 20 MIN: CPT | Performed by: FAMILY MEDICINE

## 2021-03-04 PROCEDURE — 3017F COLORECTAL CA SCREEN DOC REV: CPT | Performed by: FAMILY MEDICINE

## 2021-03-04 RX ORDER — ALISKIREN HEMIFUMARATE AND HYDROCHLOROTHIAZIDE 300; 25 MG/1; MG/1
1 TABLET, FILM COATED ORAL DAILY
Qty: 30 TABLET | Refills: 5 | Status: SHIPPED | OUTPATIENT
Start: 2021-03-04 | End: 2021-08-27

## 2021-03-04 ASSESSMENT — PATIENT HEALTH QUESTIONNAIRE - PHQ9
SUM OF ALL RESPONSES TO PHQ QUESTIONS 1-9: 0
SUM OF ALL RESPONSES TO PHQ QUESTIONS 1-9: 0
2. FEELING DOWN, DEPRESSED OR HOPELESS: 0

## 2021-03-04 NOTE — PROGRESS NOTES
3/4/2021    TELEHEALTH EVALUATION -- Audio/Visual (During XEOWX-59 public health emergency)    HPI:    Leslye Perera (:  1965) has requested an audio/video evaluation for the following concern(s):    She states she is still having extremely high blood pressure readings the addition of olmesartan seem to make absolutely no difference into what her blood pressures are running she is also been under a bit of stress her mom recently passed away    Review of Systems    Prior to Visit Medications    Medication Sig Taking? Authorizing Provider   Aliskiren-hydroCHLOROthiazide (TEKTURNA HCT) 300-25 MG TABS Take 1 tablet by mouth daily Yes Bev Greer DO   levothyroxine (SYNTHROID) 88 MCG tablet TAKE 1 TABLET BY MOUTH EVERY MORNING ON AN EMPTY STOMACH WITH WATER.  WAIT 30 MINUTES BEFORE EATING OR TAKING OTHER MEDS Yes Bev Greer DO   BYSTOLIC 20 MG TABS tablet TAKE ONE TABLET BY MOUTH DAILY Yes Bev Greer DO   olmesartan (BENICAR) 40 MG tablet TAKE ONE TABLET BY MOUTH DAILY Yes Bev Greer DO   mometasone (NASONEX) 50 MCG/ACT nasal spray SPRAY TWO SPRAYS IN EACH NOSTRIL TWICE DAILY Yes Bev Greer DO   ALPRAZolam (XANAX) 1 MG tablet  Yes Historical Provider, MD   valACYclovir (VALTREX) 500 MG tablet  Yes Historical Provider, MD   nebivolol (BYSTOLIC) 5 MG tablet TAKE ONE TABLET BY MOUTH DAILY  Patient not taking: Reported on 10/28/2020  Bev Greer DO   SYNTHROID 100 MCG tablet TAKE ONE TABLET BY MOUTH DAILY  Patient not taking: Reported on 10/28/2020  Bev Greer DO       Social History     Tobacco Use    Smoking status: Never Smoker    Smokeless tobacco: Never Used   Substance Use Topics    Alcohol use: Yes     Comment: socially    Drug use: No        Allergies   Allergen Reactions    Seasonal    ,   Past Medical History:   Diagnosis Date    Allergic rhinitis     Hypertension     Hypothyroidism    ,   Past Surgical History:   Procedure Laterality Date    BREAST SURGERY     , Social History     Tobacco Use    Smoking status: Never Smoker    Smokeless tobacco: Never Used   Substance Use Topics    Alcohol use: Yes     Comment: socially    Drug use: No   , History reviewed. No pertinent family history. PHYSICAL EXAMINATION:  [ INSTRUCTIONS:  \"[x]\" Indicates a positive item  \"[]\" Indicates a negative item  -- DELETE ALL ITEMS NOT EXAMINED]  Vital Signs: (As obtained by patient/caregiver or practitioner observation)    Blood pressure-  Heart rate-    Respiratory rate-    Temperature-  Pulse oximetry-     Constitutional: [x] Appears well-developed and well-nourished [x] No apparent distress      [] Abnormal-   Mental status  [x] Alert and awake  [x] Oriented to person/place/time [x]Able to follow commands      Eyes:  EOM    [x]  Normal  [] Abnormal-  Sclera  [x]  Normal  [] Abnormal -         Discharge [x]  None visible  [] Abnormal -    HENT:   [x] Normocephalic, atraumatic. [] Abnormal   [x] Mouth/Throat: Mucous membranes are moist.     External Ears [x] Normal  [] Abnormal-     Neck: [x] No visualized mass     Pulmonary/Chest: [x] Respiratory effort normal.  [x] No visualized signs of difficulty breathing or respiratory distress        [] Abnormal-      Musculoskeletal:   [x] Normal gait with no signs of ataxia         [x] Normal range of motion of neck        [] Abnormal-       Neurological:        [x] No Facial Asymmetry (Cranial nerve 7 motor function) (limited exam to video visit)          [x] No gaze palsy        [] Abnormal-         Skin:        [x] No significant exanthematous lesions or discoloration noted on facial skin         [] Abnormal-            Psychiatric:       [x] Normal Affect [x] No Hallucinations        [] Abnormal-     Other pertinent observable physical exam findings-     ASSESSMENT/PLAN:   Diagnosis Orders   1. Essential hypertension        No orders of the defined types were placed in this encounter.     Requested Prescriptions Signed Prescriptions Disp Refills    Aliskiren-hydroCHLOROthiazide (TEKTURNA HCT) 300-25 MG TABS 30 tablet 5     Sig: Take 1 tablet by mouth daily     Ele Marienthal     Return if symptoms worsen or fail to improve. Jesus Hassan is a 48 y.o. female being evaluated by a Virtual Visit (video visit) encounter to address concerns as mentioned above. A caregiver was present when appropriate. Due to this being a TeleHealth encounter (During NKACK-71 public health emergency), evaluation of the following organ systems was limited: Vitals/Constitutional/EENT/Resp/CV/GI//MS/Neuro/Skin/Heme-Lymph-Imm. Pursuant to the emergency declaration under the [address] [place] [address], [address] [place] [address] authority and the [employer] Act, this Virtual Visit was conducted with patient's (and/or legal guardian's) consent, to reduce the patient's risk of exposure to COVID-19 and provide necessary medical care. The patient (and/or legal guardian) has also been advised to contact this office for worsening conditions or problems, and seek emergency medical treatment and/or call 911 if deemed necessary. Patient identification was verified at the start of the visit: Yes    Total time spent on this encounter: Not billed by time    Services were provided through a video synchronous discussion virtually to substitute for in-person clinic visit. Patient and provider were located at their individual homes. --Theresa Levin DO on 3/4/2021 at 4:07 PM    An electronic signature was used to authenticate this note.

## 2021-04-28 DIAGNOSIS — I10 ESSENTIAL HYPERTENSION: ICD-10-CM

## 2021-04-28 RX ORDER — NEBIVOLOL HYDROCHLORIDE 20 MG/1
TABLET ORAL
Qty: 30 TABLET | Refills: 0 | Status: SHIPPED | OUTPATIENT
Start: 2021-04-28 | End: 2021-05-24

## 2021-05-24 DIAGNOSIS — I10 ESSENTIAL HYPERTENSION: ICD-10-CM

## 2021-05-24 RX ORDER — NEBIVOLOL HYDROCHLORIDE 20 MG/1
TABLET ORAL
Qty: 30 TABLET | Refills: 3 | Status: SHIPPED | OUTPATIENT
Start: 2021-05-24 | End: 2021-09-23

## 2021-05-24 NOTE — TELEPHONE ENCOUNTER
Calli Elizondo is calling to request a refill on the following medication(s):    Last Visit Date (If Applicable):  6/9/4086    Next Visit Date:    Visit date not found    Medication Request:  Requested Prescriptions     Pending Prescriptions Disp Refills    BYSTOLIC 20 MG TABS tablet [Pharmacy Med Name: BYSTOLIC 20 MG TABLET] 30 tablet 3     Sig: TAKE ONE TABLET BY MOUTH DAILY

## 2021-08-26 NOTE — TELEPHONE ENCOUNTER
Saintclair Linger is calling to request a refill on the following medication(s):    Last Visit Date (If Applicable):  3/7/3488    Next Visit Date:    Visit date not found    Medication Request:  Requested Prescriptions     Pending Prescriptions Disp Refills    TEKTURNA -25 MG TABS [Pharmacy Med Name: Dariela Matar -25 MG TABLET] 30 tablet 5     Sig: TAKE ONE TABLET BY MOUTH DAILY    mometasone (NASONEX) 50 MCG/ACT nasal spray [Pharmacy Med Name: MOMETASONE FUROATE 50 MCG SPRY] 1 Inhaler      Sig: SPRAY TWO SPRAYS IN EACH NOSTRIL TWICE DAILY

## 2021-08-26 NOTE — TELEPHONE ENCOUNTER
----- Message from Jayce Almaraz sent at 8/26/2021  9:40 AM EDT -----  Subject: Refill Request    QUESTIONS  Name of Medication? mometasone (NASONEX) 50 MCG/ACT nasal spray  Patient-reported dosage and instructions? takes every day   How many days do you have left? 0  Preferred Pharmacy? [facility] phone number (if available)? [phone]  Additional Information for Provider? Patient is calling she needs her   allergy medication as she is out. She did talk to the pharmacy and they   were going to reach out to the PCP as well.   ---------------------------------------------------------------------------  --------------  CALL BACK INFO  What is the best way for the office to contact you? OK to leave message on   voicemail  Preferred Call Back Phone Number?  [phone]

## 2021-08-27 RX ORDER — MOMETASONE FUROATE 50 UG/1
SPRAY, METERED NASAL
Qty: 1 INHALER | Refills: 5 | Status: SHIPPED | OUTPATIENT
Start: 2021-08-27 | End: 2022-02-23

## 2021-08-27 RX ORDER — ALISKIREN HEMIFUMARATE AND HYDROCHLOROTHIAZIDE 300; 25 MG/1; MG/1
TABLET, FILM COATED ORAL
Qty: 30 TABLET | Refills: 5 | Status: SHIPPED | OUTPATIENT
Start: 2021-08-27 | End: 2022-03-21

## 2021-09-23 DIAGNOSIS — I10 ESSENTIAL HYPERTENSION: ICD-10-CM

## 2021-09-23 RX ORDER — NEBIVOLOL HYDROCHLORIDE 20 MG/1
TABLET ORAL
Qty: 30 TABLET | Refills: 3 | Status: SHIPPED | OUTPATIENT
Start: 2021-09-23 | End: 2022-01-24

## 2021-09-23 NOTE — TELEPHONE ENCOUNTER
Jeffrey Muñoz is calling to request a refill on the following medication(s):    Last Visit Date (If Applicable):  0/9/3887    Next Visit Date:    Visit date not found    Medication Request:  Requested Prescriptions     Pending Prescriptions Disp Refills    BYSTOLIC 20 MG TABS tablet [Pharmacy Med Name: BYSTOLIC 20 MG TABLET] 30 tablet 3     Sig: take 1 tablet by mouth once daily

## 2021-11-30 NOTE — TELEPHONE ENCOUNTER
Nadya Ruff is calling to request a refill on the following medication(s):    Last Visit Date (If Applicable):  2/1/5434    Next Visit Date:    Visit date not found    Medication Request:  Requested Prescriptions     Pending Prescriptions Disp Refills    levothyroxine (SYNTHROID) 100 MCG tablet 90 tablet 3

## 2021-12-01 RX ORDER — LEVOTHYROXINE SODIUM 0.1 MG/1
TABLET ORAL
Qty: 90 TABLET | Refills: 3 | Status: SHIPPED | OUTPATIENT
Start: 2021-12-01 | End: 2021-12-02 | Stop reason: SDUPTHER

## 2021-12-01 NOTE — TELEPHONE ENCOUNTER
Patient calling for refill of medication. Patient state she completely out of medication.  Please advise

## 2021-12-02 DIAGNOSIS — E03.9 ACQUIRED HYPOTHYROIDISM: ICD-10-CM

## 2021-12-02 RX ORDER — LEVOTHYROXINE SODIUM 0.1 MG/1
TABLET ORAL
Qty: 90 TABLET | Refills: 3 | Status: SHIPPED | OUTPATIENT
Start: 2021-12-02 | End: 2022-07-26

## 2021-12-02 NOTE — TELEPHONE ENCOUNTER
Agnes Worrell is calling to request a refill on the following medication(s):    Last Visit Date (If Applicable):  2/1/9592    Next Visit Date:    Visit date not found    Medication Request:  Requested Prescriptions     Pending Prescriptions Disp Refills    levothyroxine (SYNTHROID) 100 MCG tablet 90 tablet 3     Sig: TAKE ONE TABLET BY MOUTH DAILY

## 2021-12-03 ENCOUNTER — TELEPHONE (OUTPATIENT)
Dept: FAMILY MEDICINE CLINIC | Age: 56
End: 2021-12-03

## 2021-12-03 DIAGNOSIS — E03.9 ACQUIRED HYPOTHYROIDISM: ICD-10-CM

## 2021-12-03 RX ORDER — LEVOTHYROXINE SODIUM 88 UG/1
TABLET ORAL
Qty: 90 TABLET | Refills: 3 | Status: CANCELLED | OUTPATIENT
Start: 2021-12-03

## 2021-12-03 RX ORDER — LEVOTHYROXINE SODIUM 88 UG/1
TABLET ORAL
Qty: 30 TABLET | Refills: 0 | Status: SHIPPED | OUTPATIENT
Start: 2021-12-03 | End: 2021-12-27 | Stop reason: SDUPTHER

## 2021-12-03 NOTE — TELEPHONE ENCOUNTER
----- Message from [facility] sent at 12/2/2021  4:54 PM EST -----  Subject: Medication Problem    QUESTIONS  Name of Medication? levothyroxine (SYNTHROID) 88 MCG tablet  Patient-reported dosage and instructions? TAKE 1 TABLET BY MOUTH EVERY   MORNING ON AN EMPTY STOMACH WITH WATER. WAIT 30 MINUTES BEFORE EATING OR   TAKING OTHER MEDS  What question or problem do you have with the medication? Belinda Quevedo from [place] [address] called they received this script levothyroxine (SYNTHROID) 100 MCG   tablet and it is incorrect. They need the script for levothyroxine   (SYNTHROID) 88 MCG tablet and it has to be the name brand not generic. Preferred Pharmacy? [facility], [address] [address] [phone] [facility] [phone]  Pharmacy phone number (if available)? [phone]  Additional Information for Provider?   ---------------------------------------------------------------------------  --------------  [address]  What is the best way for the office to contact you? OK to leave message on   voicemail  Preferred Call Back Phone Number? [phone]  ---------------------------------------------------------------------------  --------------  SCRIPT ANSWERS  Relationship to Patient? Third Party  Representative Name?  Belinda Quevedo
Not sure who sent the request to me for the Synthroid 100 mcg I just refilled what was sent but I also just noticed the patient has not been seen for a year and she has not done her labs for her thyroid or any labs since 2019 so I will send her a refill for the Synthroid 88 mcg but only for 30 days she needs an appointment and she will need to get lab work done so prepare to come fasting
[facility] [place] requests [facility] for  levothyroxine 88mcg instead of 100mcg. Also [facility] [place].
none

## 2021-12-15 ENCOUNTER — TELEPHONE (OUTPATIENT)
Dept: FAMILY MEDICINE CLINIC | Age: 56
End: 2021-12-15

## 2021-12-15 DIAGNOSIS — E03.9 ACQUIRED HYPOTHYROIDISM: ICD-10-CM

## 2021-12-15 DIAGNOSIS — Z00.00 WELL ADULT EXAM: Primary | ICD-10-CM

## 2021-12-15 DIAGNOSIS — I10 ESSENTIAL HYPERTENSION: ICD-10-CM

## 2021-12-15 NOTE — TELEPHONE ENCOUNTER
She has not done her blood work since 2019 so until she gets it done I am only giving her 30-day supply

## 2021-12-15 NOTE — TELEPHONE ENCOUNTER
----- Message from Jose Benoit sent at 12/15/2021  3:17 PM EST -----  Subject: Medication Problem    QUESTIONS  Name of Medication? levothyroxine (SYNTHROID) 88 MCG tablet  Patient-reported dosage and instructions? once a day  What question or problem do you have with the medication? patient is   wondering why she was only given a 30 day supply of the medication and was   wondering if she would need to schedule a visit to receive the next   refill. If she does, patient would like to schedule that appointment. Preferred Pharmacy? [facility], [address] [facility]   [facility] [phone] [facility] [place] [place] [phone]  Pharmacy phone number (if available)? [phone]  Additional Information for Provider?   ---------------------------------------------------------------------------  --------------  [address]  What is the best way for the office to contact you? OK to leave message on   voicemail  Preferred Call Back Phone Number? [phone]  ---------------------------------------------------------------------------  --------------  SCRIPT ANSWERS  Relationship to Patient?  Self

## 2021-12-21 ENCOUNTER — HOSPITAL ENCOUNTER (OUTPATIENT)
Age: 56
Setting detail: SPECIMEN
Discharge: HOME | End: 2021-12-21

## 2021-12-21 DIAGNOSIS — E03.9 ACQUIRED HYPOTHYROIDISM: ICD-10-CM

## 2021-12-21 DIAGNOSIS — Z00.00 WELL ADULT EXAM: ICD-10-CM

## 2021-12-21 DIAGNOSIS — I10 ESSENTIAL HYPERTENSION: ICD-10-CM

## 2021-12-21 LAB
ABSOLUTE EOS #: 0.1 K/UL (ref 0–0.44)
ABSOLUTE IMMATURE GRANULOCYTE: <0.03 K/UL (ref 0–0.3)
ABSOLUTE LYMPH #: 1.73 K/UL (ref 1.1–3.7)
ABSOLUTE MONO #: 0.69 K/UL (ref 0.1–1.2)
ALBUMIN SERPL-MCNC: 4.7 G/DL (ref 3.5–5.2)
ALBUMIN/GLOBULIN RATIO: 1.5 (ref 1–2.5)
ALP BLD-CCNC: 74 U/L (ref 35–104)
ALT SERPL-CCNC: 19 U/L (ref 5–33)
ANION GAP SERPL CALCULATED.3IONS-SCNC: 16 MMOL/L (ref 9–17)
AST SERPL-CCNC: 22 U/L
BASOPHILS # BLD: 1 % (ref 0–2)
BASOPHILS ABSOLUTE: 0.07 K/UL (ref 0–0.2)
BILIRUB SERPL-MCNC: 0.47 MG/DL (ref 0.3–1.2)
BUN BLDV-MCNC: 20 MG/DL (ref 6–20)
BUN/CREAT BLD: NORMAL (ref 9–20)
CALCIUM SERPL-MCNC: 10.1 MG/DL (ref 8.6–10.4)
CHLORIDE BLD-SCNC: 99 MMOL/L (ref 98–107)
CHOLESTEROL/HDL RATIO: 2.5
CHOLESTEROL: 238 MG/DL
CO2: 24 MMOL/L (ref 20–31)
CREAT SERPL-MCNC: 0.81 MG/DL (ref 0.5–0.9)
DIFFERENTIAL TYPE: ABNORMAL
EOSINOPHILS RELATIVE PERCENT: 2 % (ref 1–4)
GFR AFRICAN AMERICAN: >60 ML/MIN
GFR NON-AFRICAN AMERICAN: >60 ML/MIN
GFR SERPL CREATININE-BSD FRML MDRD: NORMAL ML/MIN/{1.73_M2}
GFR SERPL CREATININE-BSD FRML MDRD: NORMAL ML/MIN/{1.73_M2}
GLUCOSE BLD-MCNC: 92 MG/DL (ref 70–99)
HCT VFR BLD CALC: 44.2 % (ref 36.3–47.1)
HDLC SERPL-MCNC: 96 MG/DL
HEMOGLOBIN: 14.2 G/DL (ref 11.9–15.1)
IMMATURE GRANULOCYTES: 0 %
LDL CHOLESTEROL: 128 MG/DL (ref 0–130)
LYMPHOCYTES # BLD: 33 % (ref 24–43)
MCH RBC QN AUTO: 30.2 PG (ref 25.2–33.5)
MCHC RBC AUTO-ENTMCNC: 32.1 G/DL (ref 28.4–34.8)
MCV RBC AUTO: 94 FL (ref 82.6–102.9)
MONOCYTES # BLD: 13 % (ref 3–12)
NRBC AUTOMATED: 0 PER 100 WBC
PDW BLD-RTO: 12.6 % (ref 11.8–14.4)
PLATELET # BLD: 332 K/UL (ref 138–453)
PLATELET ESTIMATE: ABNORMAL
PMV BLD AUTO: 10.9 FL (ref 8.1–13.5)
POTASSIUM SERPL-SCNC: 4.8 MMOL/L (ref 3.7–5.3)
RBC # BLD: 4.7 M/UL (ref 3.95–5.11)
RBC # BLD: ABNORMAL 10*6/UL
SEG NEUTROPHILS: 51 % (ref 36–65)
SEGMENTED NEUTROPHILS ABSOLUTE COUNT: 2.69 K/UL (ref 1.5–8.1)
SODIUM BLD-SCNC: 139 MMOL/L (ref 135–144)
THYROXINE, FREE: 1.18 NG/DL (ref 0.93–1.7)
TOTAL PROTEIN: 7.9 G/DL (ref 6.4–8.3)
TRIGL SERPL-MCNC: 68 MG/DL
TSH SERPL DL<=0.05 MIU/L-ACNC: 0.2 MIU/L (ref 0.3–5)
VITAMIN D 25-HYDROXY: 38.8 NG/ML (ref 30–100)
VLDLC SERPL CALC-MCNC: ABNORMAL MG/DL (ref 1–30)
WBC # BLD: 5.3 K/UL (ref 3.5–11.3)
WBC # BLD: ABNORMAL 10*3/UL

## 2021-12-27 DIAGNOSIS — E03.9 ACQUIRED HYPOTHYROIDISM: ICD-10-CM

## 2021-12-27 RX ORDER — LEVOTHYROXINE SODIUM 88 UG/1
TABLET ORAL
Qty: 30 TABLET | Refills: 0 | Status: SHIPPED | OUTPATIENT
Start: 2021-12-27 | End: 2022-01-25

## 2021-12-27 RX ORDER — HYDROCHLOROTHIAZIDE 25 MG/1
25 TABLET ORAL EVERY MORNING
Qty: 30 TABLET | Refills: 0 | Status: SHIPPED | OUTPATIENT
Start: 2021-12-27 | End: 2022-07-26

## 2021-12-27 RX ORDER — ALISKIREN 300 MG/1
300 TABLET, FILM COATED ORAL DAILY
Qty: 30 TABLET | Refills: 3 | Status: SHIPPED | OUTPATIENT
Start: 2021-12-27 | End: 2022-07-26

## 2021-12-27 NOTE — TELEPHONE ENCOUNTER
Marcelino Cantu is calling to request a refill on the following medication(s):    Last Visit Date (If Applicable):  0/0/4147    Next Visit Date:    Visit date not found    Medication Request:  Requested Prescriptions     Pending Prescriptions Disp Refills    levothyroxine (SYNTHROID) 88 MCG tablet 30 tablet 0     Sig: TAKE 1 TABLET BY MOUTH EVERY MORNING ON AN EMPTY STOMACH WITH WATER.  WAIT 30 MINUTES BEFORE EATING OR TAKING OTHER MEDS

## 2022-01-23 DIAGNOSIS — I10 ESSENTIAL HYPERTENSION: ICD-10-CM

## 2022-01-24 DIAGNOSIS — E03.9 ACQUIRED HYPOTHYROIDISM: ICD-10-CM

## 2022-01-24 RX ORDER — NEBIVOLOL HYDROCHLORIDE 20 MG/1
TABLET ORAL
Qty: 30 TABLET | Refills: 3 | Status: SHIPPED | OUTPATIENT
Start: 2022-01-24 | End: 2022-05-26

## 2022-01-24 NOTE — TELEPHONE ENCOUNTER
Kishore Dejesus is calling to request a refill on the following medication(s):    Last Visit Date (If Applicable):  8/8/0325    Next Visit Date:    Visit date not found    Medication Request:  Requested Prescriptions     Pending Prescriptions Disp Refills    BYSTOLIC 20 MG TABS tablet [Pharmacy Med Name: BYSTOLIC 20 MG TABLET] 30 tablet 3     Sig: take 1 tablet by mouth once daily

## 2022-01-25 RX ORDER — LEVOTHYROXINE SODIUM 88 UG/1
TABLET ORAL
Qty: 30 TABLET | Refills: 0 | Status: SHIPPED | OUTPATIENT
Start: 2022-01-25 | End: 2022-02-25

## 2022-01-25 NOTE — TELEPHONE ENCOUNTER
[facility]erry [facility] is calling to request a refill on the following medication(s):    Last Visit Date (If Applicable):  Visit date not found    Next Visit Date:    Visit date not found    Medication Request:  Requested Prescriptions     Pending Prescriptions Disp Refills    levothyroxine (SYNTHROID) 88 MCG tablet [Pharmacy Med Name: SYNTHROID 88 MCG TABLET] 30 tablet 0     Sig: take 1 tablet by mouth every morning ON AN EMPTY STOMACH with WATER WAIT 30 MINUTES BEFORE EATING OR TAKING OTHER MEDS

## 2022-02-23 RX ORDER — MOMETASONE FUROATE 50 UG/1
SPRAY, METERED NASAL
Qty: 17 G | Refills: 0 | Status: SHIPPED | OUTPATIENT
Start: 2022-02-23 | End: 2022-03-21

## 2022-02-23 NOTE — TELEPHONE ENCOUNTER
Reena Clayton is calling to request a refill on the following medication(s):    Last Visit Date (If Applicable):  3/7/9306    Next Visit Date:    Visit date not found    Medication Request:  Requested Prescriptions     Pending Prescriptions Disp Refills    mometasone (NASONEX) 50 MCG/ACT nasal spray [Pharmacy Med Name: MOMETASONE FUROATE 50 MCG SPRY] 17 g      Sig: instill 2 sprays into each nostril twice a day

## 2022-02-25 DIAGNOSIS — E03.9 ACQUIRED HYPOTHYROIDISM: ICD-10-CM

## 2022-02-25 RX ORDER — LEVOTHYROXINE SODIUM 88 UG/1
TABLET ORAL
Qty: 30 TABLET | Refills: 0 | Status: SHIPPED | OUTPATIENT
Start: 2022-02-25 | End: 2022-03-21

## 2022-02-25 NOTE — TELEPHONE ENCOUNTER
Cherrie Leo is calling to request a refill on the following medication(s):    Last Visit Date (If Applicable):  1/3/9152    Next Visit Date:    Visit date not found    Medication Request:  Requested Prescriptions     Pending Prescriptions Disp Refills    levothyroxine (SYNTHROID) 88 MCG tablet [Pharmacy Med Name: SYNTHROID 88 MCG TABLET] 30 tablet 0     Sig: take 1 tablet by mouth IN THE MORNING ON AN EMPTY STOMACH with water then WAIT 30 MINUTES before EATING OR TAKING OTHER MEDS

## 2022-03-19 DIAGNOSIS — E03.9 ACQUIRED HYPOTHYROIDISM: ICD-10-CM

## 2022-03-21 RX ORDER — ALISKIREN HEMIFUMARATE AND HYDROCHLOROTHIAZIDE 300; 25 MG/1; MG/1
TABLET, FILM COATED ORAL
Qty: 30 TABLET | Refills: 0 | Status: SHIPPED | OUTPATIENT
Start: 2022-03-21 | End: 2022-04-27

## 2022-03-21 RX ORDER — LEVOTHYROXINE SODIUM 88 UG/1
TABLET ORAL
Qty: 30 TABLET | Refills: 0 | Status: SHIPPED | OUTPATIENT
Start: 2022-03-21 | End: 2022-04-27

## 2022-03-21 RX ORDER — MOMETASONE FUROATE 50 UG/1
SPRAY, METERED NASAL
Qty: 17 G | Refills: 0 | Status: SHIPPED | OUTPATIENT
Start: 2022-03-21 | End: 2022-04-27

## 2022-03-21 NOTE — TELEPHONE ENCOUNTER
Zoila Burden is calling to request a refill on the following medication(s):    Last Visit Date (If Applicable):  6/6/7737    Next Visit Date:    Visit date not found    Medication Request:  Requested Prescriptions     Pending Prescriptions Disp Refills    levothyroxine (SYNTHROID) 88 MCG tablet [Pharmacy Med Name: SYNTHROID 88 MCG TABLET] 30 tablet 0     Sig: take 1 tablet by mouth IN THE MORNING ON AN EMPTY STOMACH with water then WAIT 30 MINUTES before EATING OR TAKING OTHER MEDS    TEKTURNA -25 MG TABS [Pharmacy Med Name: Lucilla Ouch -25 MG TABLET] 30 tablet 5     Sig: take 1 tablet by mouth once daily    mometasone (NASONEX) 50 MCG/ACT nasal spray [Pharmacy Med Name: MOMETASONE FUROATE 50 MCG SPRY] 17 g 0     Sig: instill 2 sprays into each nostril twice a day

## 2022-04-27 DIAGNOSIS — E03.9 ACQUIRED HYPOTHYROIDISM: ICD-10-CM

## 2022-04-27 RX ORDER — MOMETASONE FUROATE 50 UG/1
SPRAY, METERED NASAL
Qty: 17 G | Refills: 0 | Status: SHIPPED | OUTPATIENT
Start: 2022-04-27 | End: 2022-05-27 | Stop reason: SDUPTHER

## 2022-04-27 RX ORDER — ALISKIREN HEMIFUMARATE AND HYDROCHLOROTHIAZIDE 300; 25 MG/1; MG/1
TABLET, FILM COATED ORAL
Qty: 30 TABLET | Refills: 0 | Status: SHIPPED | OUTPATIENT
Start: 2022-04-27 | End: 2022-05-27 | Stop reason: SDUPTHER

## 2022-04-27 RX ORDER — LEVOTHYROXINE SODIUM 88 UG/1
TABLET ORAL
Qty: 30 TABLET | Refills: 0 | Status: SHIPPED | OUTPATIENT
Start: 2022-04-27 | End: 2022-05-27 | Stop reason: SDUPTHER

## 2022-05-26 DIAGNOSIS — I10 ESSENTIAL HYPERTENSION: ICD-10-CM

## 2022-05-26 DIAGNOSIS — E03.9 ACQUIRED HYPOTHYROIDISM: ICD-10-CM

## 2022-05-26 RX ORDER — NEBIVOLOL 20 MG/1
TABLET ORAL
Qty: 30 TABLET | Refills: 3 | Status: SHIPPED | OUTPATIENT
Start: 2022-05-26 | End: 2022-07-26 | Stop reason: SDUPTHER

## 2022-05-26 NOTE — TELEPHONE ENCOUNTER
Zoila Burden is calling to request a refill on the following medication(s):    Last Visit Date (If Applicable):  0/3/1499    Next Visit Date:    Visit date not found    Medication Request:  Requested Prescriptions     Pending Prescriptions Disp Refills    nebivolol (BYSTOLIC) 20 MG TABS tablet [Pharmacy Med Name: NEBIVOLOL 20 MG TABLET] 30 tablet 3     Sig: take 1 tablet by mouth once daily

## 2022-05-27 RX ORDER — LEVOTHYROXINE SODIUM 88 UG/1
TABLET ORAL
Qty: 30 TABLET | Refills: 0 | Status: SHIPPED | OUTPATIENT
Start: 2022-05-27 | End: 2022-06-29

## 2022-05-27 RX ORDER — MOMETASONE FUROATE 50 UG/1
SPRAY, METERED NASAL
Qty: 17 G | Refills: 0 | Status: SHIPPED | OUTPATIENT
Start: 2022-05-27 | End: 2022-06-29

## 2022-05-27 RX ORDER — ALISKIREN HEMIFUMARATE AND HYDROCHLOROTHIAZIDE 300; 25 MG/1; MG/1
TABLET, FILM COATED ORAL
Qty: 30 TABLET | Refills: 0 | Status: SHIPPED | OUTPATIENT
Start: 2022-05-27 | End: 2022-06-29

## 2022-05-27 NOTE — TELEPHONE ENCOUNTER
Jenn Murphy is calling to request a refill on the following medication(s):    Last Visit Date (If Applicable):  8/4/3073    Next Visit Date:    Visit date not found    Medication Request:  Requested Prescriptions     Pending Prescriptions Disp Refills    TEKTURNA -25 MG TABS [Pharmacy Med Name: Vick Sanjay -25 MG TABLET] 30 tablet 0     Sig: take 1 tablet by mouth once daily    levothyroxine (SYNTHROID) 88 MCG tablet [Pharmacy Med Name: SYNTHROID 88 MCG TABLET] 30 tablet 0     Sig: take 1 tablet by mouth IN THE MORNING ON AN EMPTY STOMACH with water then WAIT 30 MINUTES before EATING OR TAKING OTHER MEDS    mometasone (NASONEX) 50 MCG/ACT nasal spray [Pharmacy Med Name: MOMETASONE FUROATE 50 MCG SPRY] 17 g 0     Sig: instill 2 sprays into each nostril twice a day No

## 2022-06-29 DIAGNOSIS — E03.9 ACQUIRED HYPOTHYROIDISM: ICD-10-CM

## 2022-06-29 RX ORDER — LEVOTHYROXINE SODIUM 88 UG/1
TABLET ORAL
Qty: 30 TABLET | Refills: 0 | Status: SHIPPED | OUTPATIENT
Start: 2022-06-29 | End: 2022-07-26

## 2022-06-29 RX ORDER — ALISKIREN HEMIFUMARATE AND HYDROCHLOROTHIAZIDE 300; 25 MG/1; MG/1
TABLET, FILM COATED ORAL
Qty: 30 TABLET | Refills: 0 | Status: SHIPPED | OUTPATIENT
Start: 2022-06-29 | End: 2022-07-26 | Stop reason: SDUPTHER

## 2022-06-29 RX ORDER — MOMETASONE FUROATE 50 UG/1
SPRAY, METERED NASAL
Qty: 17 G | Refills: 0 | Status: SHIPPED | OUTPATIENT
Start: 2022-06-29 | End: 2022-07-26 | Stop reason: SDUPTHER

## 2022-07-26 ENCOUNTER — OFFICE VISIT (OUTPATIENT)
Dept: PRIMARY CARE CLINIC | Age: 57
End: 2022-07-26
Payer: COMMERCIAL

## 2022-07-26 VITALS
WEIGHT: 178 LBS | DIASTOLIC BLOOD PRESSURE: 74 MMHG | SYSTOLIC BLOOD PRESSURE: 118 MMHG | HEART RATE: 64 BPM | BODY MASS INDEX: 30.39 KG/M2 | HEIGHT: 64 IN | OXYGEN SATURATION: 98 %

## 2022-07-26 DIAGNOSIS — J30.2 SEASONAL ALLERGIES: ICD-10-CM

## 2022-07-26 DIAGNOSIS — I10 ESSENTIAL HYPERTENSION: ICD-10-CM

## 2022-07-26 DIAGNOSIS — Z12.31 ENCOUNTER FOR SCREENING MAMMOGRAM FOR MALIGNANT NEOPLASM OF BREAST: ICD-10-CM

## 2022-07-26 DIAGNOSIS — F41.9 ANXIETY: ICD-10-CM

## 2022-07-26 DIAGNOSIS — Z12.11 SCREEN FOR COLON CANCER: ICD-10-CM

## 2022-07-26 DIAGNOSIS — G89.29 CHRONIC FOOT PAIN, RIGHT: ICD-10-CM

## 2022-07-26 DIAGNOSIS — M79.671 CHRONIC FOOT PAIN, RIGHT: ICD-10-CM

## 2022-07-26 DIAGNOSIS — E03.9 ACQUIRED HYPOTHYROIDISM: Primary | ICD-10-CM

## 2022-07-26 DIAGNOSIS — B00.9 HSV (HERPES SIMPLEX VIRUS) INFECTION: ICD-10-CM

## 2022-07-26 PROCEDURE — 3017F COLORECTAL CA SCREEN DOC REV: CPT | Performed by: NURSE PRACTITIONER

## 2022-07-26 PROCEDURE — 1036F TOBACCO NON-USER: CPT | Performed by: NURSE PRACTITIONER

## 2022-07-26 PROCEDURE — G8419 CALC BMI OUT NRM PARAM NOF/U: HCPCS | Performed by: NURSE PRACTITIONER

## 2022-07-26 PROCEDURE — G8427 DOCREV CUR MEDS BY ELIG CLIN: HCPCS | Performed by: NURSE PRACTITIONER

## 2022-07-26 PROCEDURE — 99204 OFFICE O/P NEW MOD 45 MIN: CPT | Performed by: NURSE PRACTITIONER

## 2022-07-26 RX ORDER — MOMETASONE FUROATE 50 UG/1
2 SPRAY, METERED NASAL DAILY
Qty: 17 G | Refills: 3 | Status: SHIPPED | OUTPATIENT
Start: 2022-07-26

## 2022-07-26 RX ORDER — ALISKIREN HEMIFUMARATE AND HYDROCHLOROTHIAZIDE 300; 25 MG/1; MG/1
1 TABLET, FILM COATED ORAL DAILY
Qty: 90 TABLET | Refills: 1 | Status: SHIPPED | OUTPATIENT
Start: 2022-07-26

## 2022-07-26 RX ORDER — VALACYCLOVIR HYDROCHLORIDE 500 MG/1
500 TABLET, FILM COATED ORAL DAILY PRN
Qty: 10 TABLET | Refills: 1 | Status: SHIPPED | OUTPATIENT
Start: 2022-07-26 | End: 2022-08-05

## 2022-07-26 RX ORDER — ALPRAZOLAM 1 MG/1
1 TABLET ORAL DAILY PRN
Qty: 20 TABLET | Refills: 2 | Status: SHIPPED | OUTPATIENT
Start: 2022-07-26 | End: 2022-09-24

## 2022-07-26 RX ORDER — LEVOTHYROXINE SODIUM 88 UG/1
TABLET ORAL
Qty: 30 TABLET | Refills: 0 | Status: CANCELLED | OUTPATIENT
Start: 2022-07-26

## 2022-07-26 RX ORDER — NEBIVOLOL 20 MG/1
20 TABLET ORAL DAILY
Qty: 90 TABLET | Refills: 1 | Status: SHIPPED | OUTPATIENT
Start: 2022-07-26

## 2022-07-26 RX ORDER — LEVOTHYROXINE SODIUM 0.07 MG/1
75 TABLET ORAL DAILY
Qty: 30 TABLET | Refills: 2 | Status: SHIPPED | OUTPATIENT
Start: 2022-07-26 | End: 2022-10-19

## 2022-07-26 ASSESSMENT — PATIENT HEALTH QUESTIONNAIRE - PHQ9
1. LITTLE INTEREST OR PLEASURE IN DOING THINGS: 0
SUM OF ALL RESPONSES TO PHQ QUESTIONS 1-9: 0
SUM OF ALL RESPONSES TO PHQ QUESTIONS 1-9: 0
SUM OF ALL RESPONSES TO PHQ9 QUESTIONS 1 & 2: 0
SUM OF ALL RESPONSES TO PHQ QUESTIONS 1-9: 0
SUM OF ALL RESPONSES TO PHQ QUESTIONS 1-9: 0
2. FEELING DOWN, DEPRESSED OR HOPELESS: 0

## 2022-07-26 ASSESSMENT — ENCOUNTER SYMPTOMS
RHINORRHEA: 0
SORE THROAT: 0
SHORTNESS OF BREATH: 0
COLOR CHANGE: 0
VOMITING: 0
DIARRHEA: 0
ABDOMINAL PAIN: 0
NAUSEA: 0
CHEST TIGHTNESS: 0

## 2022-07-26 NOTE — PROGRESS NOTES
[address] PRIMARY CARE  [address] [place]  [address]  [address] [address]  Dept: [phone]  Dept Fax: [phone]    Katrina Livingston is a 65 y.o. female who presentstoday for her medical conditions/complaints as noted below. Katrina Livingston is c/o of  Chief Complaint   Patient presents with    New Patient     Est care         HPI:     Here to establish care  PMH significant for HTN that is well controlled with aliskiren-HCTZ 300-25 and palpitations controlled with bystolic 36PC daily  Denies CP, SOB, leg swelling or HA  Hypothyroid well controlled with levothyroxine 88mcg daily- last few TSH levels on low end, discussed reducing medication dose, pt agreeable to this  Using valtrex PRN for rare flare ups of genital HSV  Would like refill of xanax for as needed use- rarely needs this but does use when she flies or has increased anxiety, no aberrant behavior on PDMP   Due for colonoscopy, would like referral to [facility]   Due for mammogram, pap reviewed in Care Everywhere     C/o of right heel spur and pain, would like to see podiatry        No results found for: LABA1C          ( goal A1C is < 7)   No results found for: LABMICR  LDL Cholesterol (mg/dL)   Date Value   2021 128   2018 87   2017 88       (goal LDL is <100)   AST (U/L)   Date Value   2021 22     ALT (U/L)   Date Value   2021 19     BUN (mg/dL)   Date Value   2021 20     BP Readings from Last 3 Encounters:   22 118/74   10/28/20 (!) 173/93   19 137/84          (bldv205/80)    Past Medical History:   Diagnosis Date    Allergic rhinitis     Hypertension     Hypothyroidism       Past Surgical History:   Procedure Laterality Date    BREAST SURGERY         No family history on file.     Social History     Tobacco Use    Smoking status: Never    Smokeless tobacco: Never   Substance Use Topics    Alcohol use: Yes     Comment: socially      Current Outpatient Medications   Medication Sig Dispense Refill    ALPRAZolam (XANAX) 1 MG tablet Take 1 tablet by mouth daily as needed for Anxiety for up to 60 days. 20 tablet 2    mometasone (NASONEX) 50 MCG/ACT nasal spray 2 sprays by Nasal route in the morning. 17 g 3    nebivolol (BYSTOLIC) 20 MG TABS tablet Take 1 tablet by mouth in the morning. 90 tablet 1    Aliskiren-hydroCHLOROthiazide (TEKTURNA HCT) 300-25 MG TABS Take 1 tablet by mouth daily 90 tablet 1    valACYclovir (VALTREX) 500 MG tablet Take 1 tablet by mouth daily as needed (HSV flare) 10 tablet 1    levothyroxine (SYNTHROID) 75 MCG tablet Take 1 tablet by mouth in the morning. 30 tablet 2     No current facility-administered medications for this visit. Allergies   Allergen Reactions    Seasonal        Health Maintenance   Topic Date Due    Hepatitis C screen  Never done    Cervical cancer screen  Never done    Colorectal Cancer Screen  Never done    Shingles vaccine (1 of 2) Never done    Breast cancer screen  07/31/2021    COVID-19 Vaccine (4 - Booster for Moderna series) 03/16/2022    Flu vaccine (1) 09/01/2022    Depression Screen  07/26/2023    Lipids  12/21/2026    DTaP/Tdap/Td vaccine (2 - Td or Tdap) 10/28/2030    HIV screen  Completed    Hepatitis A vaccine  Aged Out    Hepatitis B vaccine  Aged Out    Hib vaccine  Aged Out    Meningococcal (ACWY) vaccine  Aged Out    Pneumococcal 0-64 years Vaccine  Aged Out       Subjective:      Review of Systems   Constitutional:  Negative for activity change, fatigue and fever. HENT:  Negative for congestion, rhinorrhea and sore throat. Eyes:  Negative for visual disturbance. Respiratory:  Negative for chest tightness and shortness of breath. Cardiovascular:  Negative for chest pain and palpitations. Gastrointestinal:  Negative for abdominal pain, diarrhea, nausea and vomiting. Endocrine: Negative for polydipsia. Genitourinary:  Negative for difficulty urinating.    Musculoskeletal:  Positive for arthralgias (right heel pain). Negative for myalgias. Skin:  Negative for color change. Neurological:  Negative for weakness and headaches. Psychiatric/Behavioral:  Negative for behavioral problems. The patient is not nervous/anxious. All other systems reviewed and are negative. Objective:   /74   Pulse 64   Ht 5' 4\" (1.626 m)   Wt 178 lb (80.7 kg)   SpO2 98%   BMI 30.55 kg/m²   Physical Exam  Vitals reviewed. Cardiovascular:      Pulses:           Dorsalis pedis pulses are 2+ on the right side. Posterior tibial pulses are 2+ on the right side. Musculoskeletal:      Right foot: Deformity (prominent heel bone) present. Feet:      Right foot:      Skin integrity: Skin integrity normal.         :       Diagnosis Orders   1. Acquired hypothyroidism  levothyroxine (SYNTHROID) 75 MCG tablet    TSH with Reflex      2. Essential hypertension  nebivolol (BYSTOLIC) 20 MG TABS tablet    Aliskiren-hydroCHLOROthiazide (TEKTURNA HCT) 300-25 MG TABS      3. Anxiety  ALPRAZolam (XANAX) 1 MG tablet      4. Seasonal allergies  mometasone (NASONEX) 50 MCG/ACT nasal spray      5. HSV (herpes simplex virus) infection  valACYclovir (VALTREX) 500 MG tablet      6. Encounter for screening mammogram for malignant neoplasm of breast  [facility] DIGITAL SCREEN W OR WO CAD BILATERAL      7. Chronic foot pain, right  [facility] - Arabella Shane, DPKEITH, Podiatry, [place]      8. Screen for colon cancer  [facility] - Sherrie Rosado DO, Gastroenterology, [place]                :          1. Acquired hypothyroidism  Stable, low TSH, reduce levothyroxine and recheck TSH in 6 weeks     - levothyroxine (SYNTHROID) 75 MCG tablet; Take 1 tablet by mouth in the morning. Dispense: 30 tablet; Refill: 2  - TSH with Reflex; Future    2. Essential hypertension  Well controlled, continue bystolic and tekturna, low salt diet and exercise     - nebivolol (BYSTOLIC) 20 MG TABS tablet; Take 1 tablet by mouth in the morning.   Dispense: 90 tablet; Refill: 1  - Aliskiren-hydroCHLOROthiazide (TEKTURNA HCT) 300-25 MG TABS; Take 1 tablet by mouth daily  Dispense: 90 tablet; Refill: 1    3. Anxiety  Stable, continue xanax PRN   - ALPRAZolam (XANAX) 1 MG tablet; Take 1 tablet by mouth daily as needed for Anxiety for up to 60 days. Dispense: 20 tablet; Refill: 2    4. Seasonal allergies  Well controlled, continue nasonex daily   - mometasone (NASONEX) 50 MCG/ACT nasal spray; 2 sprays by Nasal route in the morning. Dispense: 17 g; Refill: 3    5. HSV (herpes simplex virus) infection  Stable, valtrex rx on hand for flare  - valACYclovir (VALTREX) 500 MG tablet; Take 1 tablet by mouth daily as needed (HSV flare)  Dispense: 10 tablet; Refill: 1    6. Encounter for screening mammogram for malignant neoplasm of breast  - [facility] DIGITAL SCREEN W OR WO CAD BILATERAL; Future    7. Chronic foot pain, right  Worsening, prominent heel bone, referral to podiatry given   - [facility] - [place] DPM, Podiatry, [place]    8. Screen for colon cancer  - [facility] - Kala Huang DO, Gastroenterology, [facility]    Return in about 6 months (around 1/26/2023) for Hypertension. Patient given educational materials - see patient instructions. Discussed use, benefit, and side effects of prescribed medications. All patient questions answered. Pt voiced understanding. Reviewed health maintenance. Instructed to continue current medications, diet and exercise. Patient agreed with treatment plan. Follow up as directed.        Electronicallysigned by AMY Tate CNP on 7/27/2022 at 3:58 PM

## 2022-08-03 ENCOUNTER — OFFICE VISIT (OUTPATIENT)
Dept: PODIATRY | Age: 57
End: 2022-08-03
Payer: COMMERCIAL

## 2022-08-03 VITALS — HEIGHT: 64 IN | BODY MASS INDEX: 30.39 KG/M2 | WEIGHT: 178 LBS

## 2022-08-03 DIAGNOSIS — M79.671 RIGHT FOOT PAIN: ICD-10-CM

## 2022-08-03 DIAGNOSIS — M77.31 HEEL SPUR, RIGHT: Primary | ICD-10-CM

## 2022-08-03 PROCEDURE — G8417 CALC BMI ABV UP PARAM F/U: HCPCS | Performed by: PODIATRIST

## 2022-08-03 PROCEDURE — 99204 OFFICE O/P NEW MOD 45 MIN: CPT | Performed by: PODIATRIST

## 2022-08-03 PROCEDURE — G8427 DOCREV CUR MEDS BY ELIG CLIN: HCPCS | Performed by: PODIATRIST

## 2022-08-03 PROCEDURE — 1036F TOBACCO NON-USER: CPT | Performed by: PODIATRIST

## 2022-08-03 PROCEDURE — 3017F COLORECTAL CA SCREEN DOC REV: CPT | Performed by: PODIATRIST

## 2022-08-03 NOTE — LETTER
[address] [facility]  [address] [place] [address]  Phone: [place] [address], [place]    August 9, 2022     Jacobo Tenorio, APRN - CNP  [facility] [address]  [facility] [address]    Patient: Odette Rai   MR Number: [id number]   YOB: 1965   Date of Visit: 8/3/2022       Dear Jacobo Tenorio: Thank you for referring César Taylor to me for evaluation/treatment. Below are the relevant portions of my assessment and plan of care. If you have questions, please do not hesitate to call me. I look forward to following Nel Russo along with you.     Sincerely,      Richie Goodson DPM

## 2022-08-03 NOTE — PROGRESS NOTES
[address]  Dept: [phone]    NEW PATIENT PROGRESS NOTE  Date of patient's visit: 8/3/2022  Patient's Name:  Eliazar Castañeda YOB: 1965            Patient Care Team:  Corrinne Moots, APRN - CNP as PCP - General (Nurse Practitioner)  Corrinne Moots, APRN - CNP as PCP - [facility] Empaneled Provider        Chief Complaint   Patient presents with    New Patient    Foot Pain     Right heel pain x 2 months         HPI:   Eliazar Castañeda is a 65 y.o. female who presents to the office today complaining of right heel pain. Symptoms began 2 month(s) ago. Patient relates pain is Absent . Pain is rated 0 out of 10 and is described as none. Treatments prior to today's visit include: none. Currently denies F/C/N/V. Pt's primary care physician is AMY Griffith CNP last seen July 26 2022   she wants to know what it is     Allergies   Allergen Reactions    Seasonal        Past Medical History:   Diagnosis Date    Allergic rhinitis     Hypertension     Hypothyroidism        Prior to Admission medications    Medication Sig Start Date End Date Taking? Authorizing Provider   ALPRAZolam Glendia Reeve) 1 MG tablet Take 1 tablet by mouth daily as needed for Anxiety for up to 60 days. 7/26/22 9/24/22 Yes AMY Etienne CNP   mometasone (NASONEX) 50 MCG/ACT nasal spray 2 sprays by Nasal route in the morning. 7/26/22  Yes AMY Etienne CNP   nebivolol (BYSTOLIC) 20 MG TABS tablet Take 1 tablet by mouth in the morning.  7/26/22  Yes Corrinne Moots, APRN - CNP   Aliskiren-hydroCHLOROthiazide (TEKTURNA HCT) 300-25 MG TABS Take 1 tablet by mouth daily 7/26/22  Yes AMY Etienne CNP   valACYclovir (VALTREX) 500 MG tablet Take 1 tablet by mouth daily as needed (HSV flare) 7/26/22 8/5/22 Yes Corrinne Moots, APRN - CNP   levothyroxine (SYNTHROID) 75 MCG tablet Take 1 tablet by mouth in the morning. 7/26/22  Yes Fabio Koyanagi, APRN - CNP       Past Surgical History:   Procedure Laterality Date    BREAST SURGERY         History reviewed. No pertinent family history. Social History     Tobacco Use    Smoking status: Never    Smokeless tobacco: Never   Substance Use Topics    Alcohol use: Yes     Comment: socially       Review of Systems    Review of Systems:   History obtained from chart review and the patient  General ROS: negative for - chills, fatigue, fever, night sweats or weight gain  Constitutional: Negative for chills, diaphoresis, fatigue, fever and unexpected weight change. Musculoskeletal: Positive for arthralgias, gait problem and joint swelling. Neurological ROS: negative for - behavioral changes, confusion, headaches or seizures. Negative for weakness and numbness. Dermatological ROS: negative for - mole changes, rash  Cardiovascular: Negative for leg swelling. Gastrointestinal: Negative for constipation, diarrhea, nausea and vomiting. Lower Extremity Physical Examination:   Vitals: There were no vitals filed for this visit. General: AAO x 3 in NAD. Dermatologic Exam:  Skin lesion/ulceration Absent . Skin No rashes or nodules noted. .       Musculoskeletal:     1st MPJ ROM decreased, Bilateral.  Muscle strength 5/5, Bilateral.  Pain present upon palpation of right posterior calcansue. There is equinus contracture of the right gastroc  . Medial longitudinal arch, Bilateral WNL.   Ankle ROM WNL,Bilateral.    Dorsally contracted digits absent digits 1-5 Bilateral.     Vascular: DP and PT pulses palpable 2/4, Bilateral.  CFT <3 seconds, Bilateral.  Hair growth present to the level of the digits, Bilateral.  Edema absent, Bilateral.  Varicosities absent, Bilateral. Erythema absent, Bilateral    Neurological: Sensation intact to light touch to level of digits, Bilateral.  Protective sensation intact 10/10 sites via 5.07/10g [place] addressed at todays visit and all questions were answered.   A total of 45 minutes was spent with this patients encounter which included charting after the patients visit    8/3/2022    Electronically signed by Meaghan Barnes DPM on 8/3/2022 at 11:38 AM  8/3/2022

## 2022-10-19 DIAGNOSIS — E03.9 ACQUIRED HYPOTHYROIDISM: ICD-10-CM

## 2022-10-19 RX ORDER — LEVOTHYROXINE SODIUM 75 MCG
TABLET ORAL
Qty: 30 TABLET | Refills: 2 | Status: SHIPPED | OUTPATIENT
Start: 2022-10-19

## 2022-11-21 DIAGNOSIS — J30.2 SEASONAL ALLERGIES: ICD-10-CM

## 2022-11-21 RX ORDER — MOMETASONE FUROATE 50 UG/1
SPRAY, METERED NASAL
Qty: 17 G | Refills: 3 | Status: SHIPPED | OUTPATIENT
Start: 2022-11-21

## 2023-01-22 DIAGNOSIS — E03.9 ACQUIRED HYPOTHYROIDISM: ICD-10-CM

## 2023-01-22 DIAGNOSIS — I10 ESSENTIAL HYPERTENSION: ICD-10-CM

## 2023-01-23 RX ORDER — ALISKIREN HEMIFUMARATE AND HYDROCHLOROTHIAZIDE 300; 25 MG/1; MG/1
TABLET, FILM COATED ORAL
Qty: 90 TABLET | Refills: 1 | Status: SHIPPED | OUTPATIENT
Start: 2023-01-23

## 2023-01-23 RX ORDER — LEVOTHYROXINE SODIUM 75 MCG
TABLET ORAL
Qty: 30 TABLET | Refills: 2 | Status: SHIPPED | OUTPATIENT
Start: 2023-01-23

## 2023-01-23 RX ORDER — NEBIVOLOL 20 MG/1
TABLET ORAL
Qty: 90 TABLET | Refills: 1 | Status: SHIPPED | OUTPATIENT
Start: 2023-01-23

## 2023-01-31 ENCOUNTER — TELEPHONE (OUTPATIENT)
Dept: PRIMARY CARE CLINIC | Age: 58
End: 2023-01-31

## 2023-01-31 DIAGNOSIS — Z12.11 SCREEN FOR COLON CANCER: Primary | ICD-10-CM

## 2023-01-31 NOTE — TELEPHONE ENCOUNTER
----- Message from Amena Miller sent at 1/31/2023  9:45 AM EST -----  Subject: Referral Request    Reason for referral request? colonoscopy  Provider patient wants to be referred to(if known):     Provider Phone Number(if known):    Additional Information for Provider? Patient states that she was given a   referral back in July but the place that the referral went to said it was   to old and they need another referral sent.  ---------------------------------------------------------------------------  --------------  CALL BACK INFO    [phone]; OK to leave message on voicemail  ---------------------------------------------------------------------------  --------------

## 2023-03-21 DIAGNOSIS — J30.2 SEASONAL ALLERGIES: ICD-10-CM

## 2023-03-21 RX ORDER — MOMETASONE FUROATE 50 UG/1
SPRAY, METERED NASAL
Qty: 17 G | Refills: 3 | Status: SHIPPED | OUTPATIENT
Start: 2023-03-21

## 2023-03-28 NOTE — TELEPHONE ENCOUNTER
Pharmacy calling into office, they no longer make patients tekturna hct, she is asking if they can split it.  Please advise

## 2023-03-29 RX ORDER — ALISKIREN 300 MG/1
300 TABLET, FILM COATED ORAL DAILY
Qty: 90 TABLET | Refills: 3 | Status: SHIPPED | OUTPATIENT
Start: 2023-03-29

## 2023-03-29 RX ORDER — HYDROCHLOROTHIAZIDE 25 MG/1
25 TABLET ORAL EVERY MORNING
Qty: 90 TABLET | Refills: 1 | Status: SHIPPED | OUTPATIENT
Start: 2023-03-29

## 2023-04-21 DIAGNOSIS — E03.9 ACQUIRED HYPOTHYROIDISM: ICD-10-CM

## 2023-04-21 RX ORDER — LEVOTHYROXINE SODIUM 75 MCG
TABLET ORAL
Qty: 30 TABLET | Refills: 2 | Status: SHIPPED | OUTPATIENT
Start: 2023-04-21

## 2023-04-21 NOTE — TELEPHONE ENCOUNTER
Last Visit Date: 7/26/2022   Next Visit Date: Visit date not found   Left VM for patient to call to sched appt

## 2023-06-01 ENCOUNTER — OFFICE VISIT (OUTPATIENT)
Dept: PRIMARY CARE CLINIC | Age: 58
End: 2023-06-01
Payer: COMMERCIAL

## 2023-06-01 VITALS
WEIGHT: 180.8 LBS | DIASTOLIC BLOOD PRESSURE: 84 MMHG | HEART RATE: 65 BPM | OXYGEN SATURATION: 98 % | SYSTOLIC BLOOD PRESSURE: 128 MMHG | BODY MASS INDEX: 31.03 KG/M2 | RESPIRATION RATE: 16 BRPM

## 2023-06-01 DIAGNOSIS — E03.9 ACQUIRED HYPOTHYROIDISM: ICD-10-CM

## 2023-06-01 DIAGNOSIS — Z13.1 SCREENING FOR DIABETES MELLITUS (DM): ICD-10-CM

## 2023-06-01 DIAGNOSIS — Z13.0 SCREENING, ANEMIA, DEFICIENCY, IRON: ICD-10-CM

## 2023-06-01 DIAGNOSIS — I10 PRIMARY HYPERTENSION: Primary | ICD-10-CM

## 2023-06-01 DIAGNOSIS — Z12.31 ENCOUNTER FOR SCREENING MAMMOGRAM FOR BREAST CANCER: ICD-10-CM

## 2023-06-01 DIAGNOSIS — Z13.6 SCREENING FOR CARDIOVASCULAR CONDITION: ICD-10-CM

## 2023-06-01 PROCEDURE — 3078F DIAST BP <80 MM HG: CPT | Performed by: NURSE PRACTITIONER

## 2023-06-01 PROCEDURE — G8417 CALC BMI ABV UP PARAM F/U: HCPCS | Performed by: NURSE PRACTITIONER

## 2023-06-01 PROCEDURE — 3074F SYST BP LT 130 MM HG: CPT | Performed by: NURSE PRACTITIONER

## 2023-06-01 PROCEDURE — G8427 DOCREV CUR MEDS BY ELIG CLIN: HCPCS | Performed by: NURSE PRACTITIONER

## 2023-06-01 PROCEDURE — 3017F COLORECTAL CA SCREEN DOC REV: CPT | Performed by: NURSE PRACTITIONER

## 2023-06-01 PROCEDURE — 1036F TOBACCO NON-USER: CPT | Performed by: NURSE PRACTITIONER

## 2023-06-01 PROCEDURE — 99214 OFFICE O/P EST MOD 30 MIN: CPT | Performed by: NURSE PRACTITIONER

## 2023-06-01 SDOH — ECONOMIC STABILITY: HOUSING INSECURITY
IN THE LAST 12 MONTHS, WAS THERE A TIME WHEN YOU DID NOT HAVE A STEADY PLACE TO SLEEP OR SLEPT IN A SHELTER (INCLUDING NOW)?: NO

## 2023-06-01 SDOH — ECONOMIC STABILITY: INCOME INSECURITY: HOW HARD IS IT FOR YOU TO PAY FOR THE VERY BASICS LIKE FOOD, HOUSING, MEDICAL CARE, AND HEATING?: NOT HARD AT ALL

## 2023-06-01 SDOH — ECONOMIC STABILITY: FOOD INSECURITY: WITHIN THE PAST 12 MONTHS, THE FOOD YOU BOUGHT JUST DIDN'T LAST AND YOU DIDN'T HAVE MONEY TO GET MORE.: NEVER TRUE

## 2023-06-01 SDOH — ECONOMIC STABILITY: FOOD INSECURITY: WITHIN THE PAST 12 MONTHS, YOU WORRIED THAT YOUR FOOD WOULD RUN OUT BEFORE YOU GOT MONEY TO BUY MORE.: NEVER TRUE

## 2023-06-01 ASSESSMENT — PATIENT HEALTH QUESTIONNAIRE - PHQ9
SUM OF ALL RESPONSES TO PHQ QUESTIONS 1-9: 0
SUM OF ALL RESPONSES TO PHQ9 QUESTIONS 1 & 2: 0
SUM OF ALL RESPONSES TO PHQ QUESTIONS 1-9: 0
1. LITTLE INTEREST OR PLEASURE IN DOING THINGS: 0
2. FEELING DOWN, DEPRESSED OR HOPELESS: 0
SUM OF ALL RESPONSES TO PHQ QUESTIONS 1-9: 0
SUM OF ALL RESPONSES TO PHQ QUESTIONS 1-9: 0

## 2023-06-01 ASSESSMENT — ENCOUNTER SYMPTOMS
ABDOMINAL PAIN: 0
DIARRHEA: 0
NAUSEA: 0
VOMITING: 0
CHEST TIGHTNESS: 0
RHINORRHEA: 0
SHORTNESS OF BREATH: 0
COLOR CHANGE: 0
SORE THROAT: 0

## 2023-07-11 ENCOUNTER — HOSPITAL ENCOUNTER (OUTPATIENT)
Age: 58
Setting detail: SPECIMEN
Discharge: HOME | End: 2023-07-11

## 2023-07-11 ENCOUNTER — OFFICE VISIT (OUTPATIENT)
Dept: OBGYN CLINIC | Age: 58
End: 2023-07-11
Payer: COMMERCIAL

## 2023-07-11 VITALS
HEIGHT: 64 IN | BODY MASS INDEX: 30.9 KG/M2 | DIASTOLIC BLOOD PRESSURE: 82 MMHG | SYSTOLIC BLOOD PRESSURE: 124 MMHG | WEIGHT: 181 LBS

## 2023-07-11 DIAGNOSIS — E03.9 ACQUIRED HYPOTHYROIDISM: ICD-10-CM

## 2023-07-11 DIAGNOSIS — Z13.6 SCREENING FOR CARDIOVASCULAR CONDITION: ICD-10-CM

## 2023-07-11 DIAGNOSIS — R23.2 HOT FLASHES: ICD-10-CM

## 2023-07-11 DIAGNOSIS — Z78.0 POSTMENOPAUSAL: ICD-10-CM

## 2023-07-11 DIAGNOSIS — Z13.1 SCREENING FOR DIABETES MELLITUS (DM): ICD-10-CM

## 2023-07-11 DIAGNOSIS — R63.5 WEIGHT GAIN: ICD-10-CM

## 2023-07-11 DIAGNOSIS — Z13.0 SCREENING, ANEMIA, DEFICIENCY, IRON: ICD-10-CM

## 2023-07-11 DIAGNOSIS — Z01.419 WELL WOMAN EXAM WITH ROUTINE GYNECOLOGICAL EXAM: Primary | ICD-10-CM

## 2023-07-11 LAB
ALBUMIN SERPL-MCNC: 4.9 G/DL (ref 3.5–5.2)
ALBUMIN/GLOB SERPL: 1.4 {RATIO} (ref 1–2.5)
ALP SERPL-CCNC: 84 U/L (ref 35–104)
ALT SERPL-CCNC: 23 U/L (ref 5–33)
ANION GAP SERPL CALCULATED.3IONS-SCNC: 13 MMOL/L (ref 9–17)
AST SERPL-CCNC: 27 U/L
BASOPHILS # BLD: 0.06 K/UL (ref 0–0.2)
BASOPHILS NFR BLD: 1 % (ref 0–2)
BILIRUB SERPL-MCNC: 0.5 MG/DL (ref 0.3–1.2)
BUN SERPL-MCNC: 18 MG/DL (ref 6–20)
CALCIUM SERPL-MCNC: 10.3 MG/DL (ref 8.6–10.4)
CHLORIDE SERPL-SCNC: 101 MMOL/L (ref 98–107)
CHOLEST SERPL-MCNC: 258 MG/DL
CHOLESTEROL/HDL RATIO: 3.5
CO2 SERPL-SCNC: 26 MMOL/L (ref 20–31)
CREAT SERPL-MCNC: 0.9 MG/DL (ref 0.5–0.9)
EOSINOPHIL # BLD: 0.06 K/UL (ref 0–0.44)
EOSINOPHILS RELATIVE PERCENT: 1 % (ref 1–4)
ERYTHROCYTE [DISTWIDTH] IN BLOOD BY AUTOMATED COUNT: 13.6 % (ref 11.8–14.4)
GFR SERPL CREATININE-BSD FRML MDRD: >60 ML/MIN/1.73M2
GLUCOSE SERPL-MCNC: 98 MG/DL (ref 70–99)
HCT VFR BLD AUTO: 41.8 % (ref 36.3–47.1)
HDLC SERPL-MCNC: 74 MG/DL
HGB BLD-MCNC: 13.5 G/DL (ref 11.9–15.1)
IMM GRANULOCYTES # BLD AUTO: <0.03 K/UL (ref 0–0.3)
IMM GRANULOCYTES NFR BLD: 0 %
LDLC SERPL CALC-MCNC: 159 MG/DL (ref 0–130)
LYMPHOCYTES # BLD: 29 % (ref 24–43)
LYMPHOCYTES NFR BLD: 1.45 K/UL (ref 1.1–3.7)
MCH RBC QN AUTO: 30.5 PG (ref 25.2–33.5)
MCHC RBC AUTO-ENTMCNC: 32.3 G/DL (ref 28.4–34.8)
MCV RBC AUTO: 94.4 FL (ref 82.6–102.9)
MONOCYTES NFR BLD: 0.6 K/UL (ref 0.1–1.2)
MONOCYTES NFR BLD: 12 % (ref 3–12)
NEUTROPHILS NFR BLD: 57 % (ref 36–65)
NEUTS SEG NFR BLD: 2.88 K/UL (ref 1.5–8.1)
NRBC BLD-RTO: 0 PER 100 WBC
PLATELET # BLD AUTO: 256 K/UL (ref 138–453)
PMV BLD AUTO: 10.3 FL (ref 8.1–13.5)
POTASSIUM SERPL-SCNC: 4.6 MMOL/L (ref 3.7–5.3)
PROT SERPL-MCNC: 8.3 G/DL (ref 6.4–8.3)
RBC # BLD AUTO: 4.43 M/UL (ref 3.95–5.11)
SODIUM SERPL-SCNC: 140 MMOL/L (ref 135–144)
T4 FREE SERPL-MCNC: 1.1 NG/DL (ref 0.9–1.7)
TRIGL SERPL-MCNC: 127 MG/DL
TSH SERPL DL<=0.05 MIU/L-ACNC: 1.44 UIU/ML (ref 0.3–5)
WBC OTHER # BLD: 5.1 K/UL (ref 3.5–11.3)

## 2023-07-11 PROCEDURE — 99386 PREV VISIT NEW AGE 40-64: CPT | Performed by: NURSE PRACTITIONER

## 2023-07-11 PROCEDURE — 3078F DIAST BP <80 MM HG: CPT | Performed by: NURSE PRACTITIONER

## 2023-07-11 PROCEDURE — 3074F SYST BP LT 130 MM HG: CPT | Performed by: NURSE PRACTITIONER

## 2023-07-11 ASSESSMENT — PATIENT HEALTH QUESTIONNAIRE - PHQ9
SUM OF ALL RESPONSES TO PHQ QUESTIONS 1-9: 0
1. LITTLE INTEREST OR PLEASURE IN DOING THINGS: 0
SUM OF ALL RESPONSES TO PHQ QUESTIONS 1-9: 0
SUM OF ALL RESPONSES TO PHQ QUESTIONS 1-9: 0
2. FEELING DOWN, DEPRESSED OR HOPELESS: 0
SUM OF ALL RESPONSES TO PHQ QUESTIONS 1-9: 0
SUM OF ALL RESPONSES TO PHQ9 QUESTIONS 1 & 2: 0

## 2023-07-11 NOTE — PROGRESS NOTES
Jocelyn Mendoza is a 63 y.o.  here to establish care and for her annual exam.  The patient was seen and examined. The patients past medical, surgical, social and family history were reviewed. Current medications and allergies were reviewed, and documented in the chart. Works [employer]  one child and 10year old grandson. Tobacco abuse No    Last PAP: -negative cytology,  no HPV ran hx of abnormal PAP yes - in her 25s believes possibly had Colposcopy no hx of LEEP  Family hx uterine or ovarian cancer-denies  Last mammogram if [id number], Family hx of breast cancer -denies  Last Dexa scan if indicated-  Colon cancer screening if indicated-has referral has not had, family hx colon cancer -denies        Sexually active: yes ,  Dyspareunia: No, Vaginal discharge: no,  UTI symptoms: no, voiding difficulties: no, bowels regular:Yes bloating:no    Uses valtrex PRN for genital HSV flare up. Menstrual history:  LMP: , no PMB  C/o weight gain is biggest issue since menopause, states works out 4-5 times week and stays active, eats healthy feels like continues to gain weight, she has hypothyroidism on synthroid states managed. Had TSh drawn today. She admits to intermittent hot flashes but not severe or significant mood changes. She  has hx HTN well controlled. States did want to adipex because of stimulant and is sensitive to stuff like that. Reviewed contraindications to Menopausal HRT- history of breast cancer, coronary heart disease [CHD], a previous venous thromboembolic event or stroke, or active liver disease.       OB History    Para Term  AB Living   1 1           SAB IAB Ectopic Molar Multiple Live Births                    # Outcome Date GA Lbr Jax/2nd Weight Sex Delivery Anes PTL Lv   1 Para                Vitals:    23 1503   BP: 124/82   Site: Right Upper Arm   Position: Sitting   Cuff Size: Medium Adult   Weight: 181 lb (82.1 kg)   Height: 5'

## 2023-07-11 NOTE — PATIENT INSTRUCTIONS
Preventing Osteoporosis: Care Instructions  Your Care Instructions    Osteoporosis means the bones are weak and thin enough that they can break easily. The older you are, the more likely you are to get osteoporosis. But with plenty of calcium, vitamin D, and exercise, you can help prevent osteoporosis. The preteen and teen years are a key time for bone building. With the help of calcium, vitamin D, and exercise in those early years and beyond, the bones reach their peak density and strength by age 27. After age 27, your bones naturally start to thin and weaken. The stronger your bones are at around age 27, the lower your risk for osteoporosis. But no matter what your age and risk are, your bones still need calcium, vitamin D, and exercise to stay strong. Also avoid smoking, and limit alcohol. Smoking and heavy alcohol use can make your bones thinner. Talk to your doctor about any special risks you might have, such as having a close relative with osteoporosis or taking a medicine that can weaken bones. Your doctor can tell you the best ways to protect your bones from thinning. Follow-up care is a key part of your treatment and safety. Be sure to make and go to all appointments, and call your doctor if you are having problems. It's also a good idea to know your test results and keep a list of the medicines you take. How can you care for yourself at home? Get enough calcium and vitamin D. The [place] [employer] recommends adults younger than age 46 need 1,000 mg of calcium and 600 IU of vitamin D each day. Women ages 46 to 79 need 1,200 mg of calcium and 600 IU of vitamin D each day. Men ages 46 to 79 need 1,000 mg of calcium and 600 IU of vitamin D each day. Adults 71 and older need 1,200 mg of calcium and 800 IU of vitamin D each day. Eat foods rich in calcium, like yogurt, cheese, milk, and dark green vegetables. Eat foods rich in vitamin D, like eggs, fatty fish, cereal, and fortified milk.   Get some

## 2023-07-13 LAB
HPV I/H RISK 4 DNA CVX QL NAA+PROBE: NOT DETECTED
HPV SAMPLE: NORMAL
HPV, INTERPRETATION: NORMAL
HPV16 DNA CVX QL NAA+PROBE: NOT DETECTED
HPV18 DNA CVX QL NAA+PROBE: NOT DETECTED
SPECIMEN DESCRIPTION: NORMAL

## 2023-07-19 DIAGNOSIS — J30.2 SEASONAL ALLERGIES: ICD-10-CM

## 2023-07-19 DIAGNOSIS — E03.9 ACQUIRED HYPOTHYROIDISM: ICD-10-CM

## 2023-07-20 DIAGNOSIS — B00.9 HSV (HERPES SIMPLEX VIRUS) INFECTION: ICD-10-CM

## 2023-07-20 RX ORDER — LEVOTHYROXINE SODIUM 75 MCG
TABLET ORAL
Qty: 30 TABLET | Refills: 2 | Status: SHIPPED | OUTPATIENT
Start: 2023-07-20

## 2023-07-20 RX ORDER — VALACYCLOVIR HYDROCHLORIDE 500 MG/1
TABLET, FILM COATED ORAL
Qty: 10 TABLET | Refills: 1 | Status: SHIPPED | OUTPATIENT
Start: 2023-07-20

## 2023-07-20 RX ORDER — MOMETASONE FUROATE 50 UG/1
SPRAY, METERED NASAL
Qty: 17 G | Refills: 3 | Status: SHIPPED | OUTPATIENT
Start: 2023-07-20

## 2023-07-21 DIAGNOSIS — I10 ESSENTIAL HYPERTENSION: ICD-10-CM

## 2023-07-21 RX ORDER — NEBIVOLOL 20 MG/1
TABLET ORAL
Qty: 90 TABLET | Refills: 1 | Status: SHIPPED | OUTPATIENT
Start: 2023-07-21

## 2023-07-25 LAB — CYTOLOGY REPORT: NORMAL

## 2023-07-28 ENCOUNTER — TELEPHONE (OUTPATIENT)
Dept: PRIMARY CARE CLINIC | Age: 58
End: 2023-07-28

## 2023-07-28 DIAGNOSIS — I10 PRIMARY HYPERTENSION: Primary | ICD-10-CM

## 2023-07-28 NOTE — TELEPHONE ENCOUNTER
----- Message from [facility] sent at 7/28/2023 11:48 AM EDT -----  Subject: Referral Request    Reason for referral request? Patient called stating she would like a   referral to see an cardiologist. Please advise. Provider patient wants to be referred to(if known): Tesha Ontiveros    Provider Phone Number(if known):[phone]    Additional Information for Provider?  Fax number to office patient is   wanting to be referred to [phone].  ---------------------------------------------------------------------------  --------------  [address]    [phone]; OK to leave message on voicemail  ---------------------------------------------------------------------------  --------------

## 2023-10-04 RX ORDER — HYDROCHLOROTHIAZIDE 25 MG/1
25 TABLET ORAL EVERY MORNING
Qty: 90 TABLET | Refills: 1 | Status: SHIPPED | OUTPATIENT
Start: 2023-10-04

## 2023-10-10 DIAGNOSIS — E03.9 ACQUIRED HYPOTHYROIDISM: ICD-10-CM

## 2023-10-10 RX ORDER — LEVOTHYROXINE SODIUM 75 MCG
TABLET ORAL
Qty: 30 TABLET | Refills: 2 | Status: SHIPPED | OUTPATIENT
Start: 2023-10-10

## 2023-10-13 ENCOUNTER — TELEPHONE (OUTPATIENT)
Dept: PRIMARY CARE CLINIC | Age: 58
End: 2023-10-13

## 2023-11-11 DIAGNOSIS — J30.2 SEASONAL ALLERGIES: ICD-10-CM

## 2023-11-12 RX ORDER — MOMETASONE FUROATE 50 UG/1
SPRAY, METERED NASAL
Qty: 17 G | Refills: 3 | Status: SHIPPED | OUTPATIENT
Start: 2023-11-12

## 2024-01-09 DIAGNOSIS — E03.9 ACQUIRED HYPOTHYROIDISM: ICD-10-CM

## 2024-01-09 RX ORDER — LEVOTHYROXINE SODIUM 75 MCG
TABLET ORAL
Qty: 30 TABLET | Refills: 2 | Status: SHIPPED | OUTPATIENT
Start: 2024-01-09

## 2024-02-13 DIAGNOSIS — I10 ESSENTIAL HYPERTENSION: ICD-10-CM

## 2024-02-13 RX ORDER — NEBIVOLOL 20 MG/1
TABLET ORAL
Qty: 90 TABLET | Refills: 1 | Status: SHIPPED | OUTPATIENT
Start: 2024-02-13

## 2024-03-05 DIAGNOSIS — J30.2 SEASONAL ALLERGIES: ICD-10-CM

## 2024-03-05 RX ORDER — MOMETASONE FUROATE 50 UG/1
SPRAY, METERED NASAL
Qty: 17 G | Refills: 3 | Status: SHIPPED | OUTPATIENT
Start: 2024-03-05

## 2024-03-07 ENCOUNTER — TELEPHONE (OUTPATIENT)
Dept: PRIMARY CARE CLINIC | Age: 59
End: 2024-03-07

## 2024-03-07 NOTE — TELEPHONE ENCOUNTER
Patient called stating she does not want to go to a hospital or the mammogram van for her mammogram.  Patient asked about a [facility] facility, gave patient [facility] scheduling number to see if done at [facility] (per pt) she will call back with facility name so office can fax order.

## 2024-03-29 RX ORDER — HYDROCHLOROTHIAZIDE 25 MG/1
25 TABLET ORAL EVERY MORNING
Qty: 90 TABLET | Refills: 1 | Status: SHIPPED | OUTPATIENT
Start: 2024-03-29

## 2024-03-29 RX ORDER — ALISKIREN 300 MG/1
300 TABLET, FILM COATED ORAL DAILY
Qty: 90 TABLET | Refills: 0 | Status: SHIPPED | OUTPATIENT
Start: 2024-03-29

## 2024-04-03 DIAGNOSIS — E03.9 ACQUIRED HYPOTHYROIDISM: ICD-10-CM

## 2024-04-03 RX ORDER — LEVOTHYROXINE SODIUM 75 MCG
TABLET ORAL
Qty: 30 TABLET | Refills: 2 | Status: SHIPPED | OUTPATIENT
Start: 2024-04-03

## 2024-04-12 ENCOUNTER — OFFICE VISIT (OUTPATIENT)
Dept: PRIMARY CARE CLINIC | Age: 59
End: 2024-04-12
Payer: COMMERCIAL

## 2024-04-12 VITALS
BODY MASS INDEX: 31.86 KG/M2 | RESPIRATION RATE: 16 BRPM | OXYGEN SATURATION: 96 % | SYSTOLIC BLOOD PRESSURE: 124 MMHG | WEIGHT: 185.6 LBS | DIASTOLIC BLOOD PRESSURE: 74 MMHG | HEART RATE: 65 BPM

## 2024-04-12 DIAGNOSIS — C43.61 MALIGNANT MELANOMA OF RIGHT UPPER EXTREMITY INCLUDING SHOULDER (HCC): ICD-10-CM

## 2024-04-12 DIAGNOSIS — Z12.11 COLON CANCER SCREENING: ICD-10-CM

## 2024-04-12 DIAGNOSIS — E03.9 ACQUIRED HYPOTHYROIDISM: ICD-10-CM

## 2024-04-12 DIAGNOSIS — I10 ESSENTIAL HYPERTENSION: Primary | ICD-10-CM

## 2024-04-12 PROCEDURE — G8417 CALC BMI ABV UP PARAM F/U: HCPCS | Performed by: NURSE PRACTITIONER

## 2024-04-12 PROCEDURE — 3074F SYST BP LT 130 MM HG: CPT | Performed by: NURSE PRACTITIONER

## 2024-04-12 PROCEDURE — 99214 OFFICE O/P EST MOD 30 MIN: CPT | Performed by: NURSE PRACTITIONER

## 2024-04-12 PROCEDURE — 3078F DIAST BP <80 MM HG: CPT | Performed by: NURSE PRACTITIONER

## 2024-04-12 PROCEDURE — 3017F COLORECTAL CA SCREEN DOC REV: CPT | Performed by: NURSE PRACTITIONER

## 2024-04-12 PROCEDURE — 1036F TOBACCO NON-USER: CPT | Performed by: NURSE PRACTITIONER

## 2024-04-12 PROCEDURE — G8427 DOCREV CUR MEDS BY ELIG CLIN: HCPCS | Performed by: NURSE PRACTITIONER

## 2024-04-12 RX ORDER — ROSUVASTATIN CALCIUM 5 MG/1
5 TABLET, COATED ORAL DAILY
COMMUNITY
Start: 2024-04-01

## 2024-04-12 ASSESSMENT — PATIENT HEALTH QUESTIONNAIRE - PHQ9
2. FEELING DOWN, DEPRESSED OR HOPELESS: NOT AT ALL
SUM OF ALL RESPONSES TO PHQ QUESTIONS 1-9: 0
1. LITTLE INTEREST OR PLEASURE IN DOING THINGS: NOT AT ALL
SUM OF ALL RESPONSES TO PHQ9 QUESTIONS 1 & 2: 0
1. LITTLE INTEREST OR PLEASURE IN DOING THINGS: NOT AT ALL
SUM OF ALL RESPONSES TO PHQ QUESTIONS 1-9: 0
SUM OF ALL RESPONSES TO PHQ9 QUESTIONS 1 & 2: 0
2. FEELING DOWN, DEPRESSED OR HOPELESS: NOT AT ALL

## 2024-04-12 ASSESSMENT — ENCOUNTER SYMPTOMS
CHEST TIGHTNESS: 0
SORE THROAT: 0
VOMITING: 0
DIARRHEA: 0
COLOR CHANGE: 0
ABDOMINAL PAIN: 0
SHORTNESS OF BREATH: 0
NAUSEA: 0
RHINORRHEA: 0

## 2024-04-12 NOTE — PROGRESS NOTES
Medications   Medication Sig Dispense Refill    rosuvastatin (CRESTOR) 5 MG tablet Take 1 tablet by mouth daily      SYNTHROID 75 MCG tablet take 1 tablet by mouth every morning 30 tablet 2    aliskiren (TEKTURNA) 300 MG tablet take 1 tablet by mouth once daily 90 tablet 0    hydroCHLOROthiazide (HYDRODIURIL) 25 MG tablet take 1 tablet by mouth every morning 90 tablet 1    mometasone (NASONEX) 50 MCG/ACT nasal spray instill 2 sprays into each nostril every morning 17 g 3    nebivolol (BYSTOLIC) 20 MG TABS tablet take 1 tablet by mouth every morning 90 tablet 1    valACYclovir (VALTREX) 500 MG tablet take 1 tablet by mouth once daily if needed for HSV FLARE 10 tablet 1     No current facility-administered medications for this visit.     Allergies   Allergen Reactions    Seasonal        Health Maintenance   Topic Date Due    Hepatitis B vaccine (1 of 3 - 3-dose series) Never done    Hepatitis C screen  Never done    Colorectal Cancer Screen  Never done    Shingles vaccine (1 of 2) Never done    Breast cancer screen  06/07/2023    COVID-19 Vaccine (4 - 2023-24 season) 09/01/2023    Flu vaccine (Season Ended) 08/01/2024    Lipids  12/20/2024    Depression Screen  04/12/2025    Cervical cancer screen  07/11/2028    DTaP/Tdap/Td vaccine (2 - Td or Tdap) 10/28/2030    HIV screen  Completed    Hepatitis A vaccine  Aged Out    Hib vaccine  Aged Out    Polio vaccine  Aged Out    Meningococcal (ACWY) vaccine  Aged Out    Pneumococcal 0-64 years Vaccine  Aged Out       Subjective:      Review of Systems   Constitutional:  Negative for activity change, fatigue and fever.   HENT:  Negative for congestion, rhinorrhea and sore throat.    Eyes:  Negative for visual disturbance.   Respiratory:  Negative for chest tightness and shortness of breath.    Cardiovascular:  Negative for chest pain and palpitations.   Gastrointestinal:  Negative for abdominal pain, diarrhea, nausea and vomiting.   Endocrine: Negative for polydipsia.

## 2024-04-14 RX ORDER — LEVOTHYROXINE SODIUM 75 MCG
TABLET ORAL
Qty: 90 TABLET | Refills: 1 | Status: SHIPPED | OUTPATIENT
Start: 2024-04-14

## 2024-04-16 ENCOUNTER — TELEPHONE (OUTPATIENT)
Dept: PRIMARY CARE CLINIC | Age: 59
End: 2024-04-16

## 2024-04-16 NOTE — TELEPHONE ENCOUNTER
Patient called into office regarding her referral for colonoscopy screening. Patient states the referral does not list a doctor or address and she is needing to know who PCP is wanting her to see. Writer asked patient if she had a preference, patient states she does not but she believes PCP wanted her to see Dr. Reyna located in [place].     Please advise, thank you!

## 2024-04-16 NOTE — TELEPHONE ENCOUNTER
Writer called patient to let her know the referral goes to Dr Bobby Reyna's office in [place] (General Surgery)    [address], [place] [address]   PH: [phone]    [facility] asking patient to return call to office

## 2024-04-16 NOTE — TELEPHONE ENCOUNTER
That [facility] colonoscopy order goes to Dr. Reyna at [place]. They should call her to schedule

## 2024-04-19 ENCOUNTER — TELEPHONE (OUTPATIENT)
Dept: SURGERY | Age: 59
End: 2024-04-19

## 2024-04-19 NOTE — TELEPHONE ENCOUNTER
[facility] General Surgery  Screening colonoscopy questionnaire  Bev Tristan    Pt Name: Nuvia Hudson  MRN: [id number]  YOB: 1965  Primary Care Physician: Maryanne Hendrix APRN - CNP      Have you ever had a colonoscopy? No  When was your last colonoscopy? N/A  Were any polyps removed or any other significant findings? N/A    Have you recently had a stool test to check for colon cancer? No  Was it positive? No    Do you have any family history of colon cancer? No  If yes, which family member had colon cancer? N/A  Were they diagnosed younger or older than the age of 60?  N/A    Do you have a history of Crohn's disease or Ulcerative Colitis? No    Do you have a history of constipation? No    Do you have a history of bloody or black stools? No    Have you ever had surgery done inside your abdomen? No  What surgery? N/A    8. Are you taking any blood thinners? No   If yes, what is the name of the blood thinner? N/A    Schedulin/19/24 - Spoke to patient, colonoscopy scheduled at [place], patient confirmed and information mailed to patient.     Surgery date/time: 24 at 9AM  Arrival time: 7:30AM  Prep appt: PHONE CALL, 1WK PRIOR. MIRALAX INSTRUCTIONS SENT TO PATIENT(S) SHOLA

## 2024-05-13 ENCOUNTER — TELEPHONE (OUTPATIENT)
Dept: SURGERY | Age: 59
End: 2024-05-13

## 2024-05-13 RX ORDER — BISACODYL 5 MG/1
TABLET, DELAYED RELEASE ORAL
Qty: 4 TABLET | Refills: 0 | Status: SHIPPED | OUTPATIENT
Start: 2024-05-13

## 2024-05-13 RX ORDER — POLYETHYLENE GLYCOL 3350 17 G/17G
POWDER, FOR SOLUTION ORAL
Qty: 238 G | Refills: 0 | Status: SHIPPED | OUTPATIENT
Start: 2024-05-13

## 2024-05-16 NOTE — DISCHARGE INSTRUCTIONS

## 2024-05-22 ENCOUNTER — ANESTHESIA EVENT (OUTPATIENT)
Dept: OPERATING ROOM | Age: 59
End: 2024-05-22
Payer: COMMERCIAL

## 2024-05-23 ENCOUNTER — HOSPITAL ENCOUNTER (OUTPATIENT)
Age: 59
Setting detail: OUTPATIENT SURGERY
Discharge: HOME | End: 2024-05-23
Attending: SURGERY | Admitting: SURGERY
Payer: COMMERCIAL

## 2024-05-23 ENCOUNTER — ANESTHESIA (OUTPATIENT)
Dept: OPERATING ROOM | Age: 59
End: 2024-05-23
Payer: COMMERCIAL

## 2024-05-23 VITALS
RESPIRATION RATE: 13 BRPM | WEIGHT: 182 LBS | OXYGEN SATURATION: 97 % | SYSTOLIC BLOOD PRESSURE: 126 MMHG | DIASTOLIC BLOOD PRESSURE: 73 MMHG | HEART RATE: 64 BPM | TEMPERATURE: 97 F | HEIGHT: 64 IN | BODY MASS INDEX: 31.07 KG/M2

## 2024-05-23 DIAGNOSIS — Z12.11 SCREENING FOR COLON CANCER: ICD-10-CM

## 2024-05-23 PROCEDURE — 2709999900 HC NON-CHARGEABLE SUPPLY: Performed by: SURGERY

## 2024-05-23 PROCEDURE — 6360000002 HC RX W HCPCS: Performed by: NURSE ANESTHETIST, CERTIFIED REGISTERED

## 2024-05-23 PROCEDURE — 3700000000 HC ANESTHESIA ATTENDED CARE: Performed by: SURGERY

## 2024-05-23 PROCEDURE — 3700000001 HC ADD 15 MINUTES (ANESTHESIA): Performed by: SURGERY

## 2024-05-23 PROCEDURE — 3609010600 HC COLONOSCOPY POLYPECTOMY SNARE/COLD BIOPSY: Performed by: SURGERY

## 2024-05-23 PROCEDURE — 45385 COLONOSCOPY W/LESION REMOVAL: CPT | Performed by: SURGERY

## 2024-05-23 PROCEDURE — 2580000003 HC RX 258: Performed by: ANESTHESIOLOGY

## 2024-05-23 PROCEDURE — 7100000011 HC PHASE II RECOVERY - ADDTL 15 MIN: Performed by: SURGERY

## 2024-05-23 PROCEDURE — 88305 TISSUE EXAM BY PATHOLOGIST: CPT

## 2024-05-23 PROCEDURE — 7100000010 HC PHASE II RECOVERY - FIRST 15 MIN: Performed by: SURGERY

## 2024-05-23 RX ORDER — PROPOFOL 10 MG/ML
INJECTION, EMULSION INTRAVENOUS PRN
Status: DISCONTINUED | OUTPATIENT
Start: 2024-05-23 | End: 2024-05-23 | Stop reason: SDUPTHER

## 2024-05-23 RX ORDER — SODIUM CHLORIDE 0.9 % (FLUSH) 0.9 %
5-40 SYRINGE (ML) INJECTION EVERY 12 HOURS SCHEDULED
Status: DISCONTINUED | OUTPATIENT
Start: 2024-05-23 | End: 2024-05-23 | Stop reason: HOSPADM

## 2024-05-23 RX ORDER — SODIUM CHLORIDE 9 MG/ML
INJECTION, SOLUTION INTRAVENOUS PRN
Status: CANCELLED | OUTPATIENT
Start: 2024-05-23

## 2024-05-23 RX ORDER — LABETALOL HYDROCHLORIDE 5 MG/ML
10 INJECTION, SOLUTION INTRAVENOUS
Status: CANCELLED | OUTPATIENT
Start: 2024-05-23

## 2024-05-23 RX ORDER — GLYCOPYRROLATE 1 MG/5 ML
SYRINGE (ML) INTRAVENOUS PRN
Status: DISCONTINUED | OUTPATIENT
Start: 2024-05-23 | End: 2024-05-23 | Stop reason: SDUPTHER

## 2024-05-23 RX ORDER — MORPHINE SULFATE 2 MG/ML
1 INJECTION, SOLUTION INTRAMUSCULAR; INTRAVENOUS EVERY 5 MIN PRN
Status: CANCELLED | OUTPATIENT
Start: 2024-05-23

## 2024-05-23 RX ORDER — OXYCODONE HYDROCHLORIDE 5 MG/1
5 TABLET ORAL PRN
Status: CANCELLED | OUTPATIENT
Start: 2024-05-23 | End: 2024-05-23

## 2024-05-23 RX ORDER — SODIUM CHLORIDE 0.9 % (FLUSH) 0.9 %
5-40 SYRINGE (ML) INJECTION PRN
Status: DISCONTINUED | OUTPATIENT
Start: 2024-05-23 | End: 2024-05-23 | Stop reason: HOSPADM

## 2024-05-23 RX ORDER — SODIUM CHLORIDE 0.9 % (FLUSH) 0.9 %
5-40 SYRINGE (ML) INJECTION PRN
Status: CANCELLED | OUTPATIENT
Start: 2024-05-23

## 2024-05-23 RX ORDER — ONDANSETRON 2 MG/ML
4 INJECTION INTRAMUSCULAR; INTRAVENOUS
Status: CANCELLED | OUTPATIENT
Start: 2024-05-23 | End: 2024-05-24

## 2024-05-23 RX ORDER — SODIUM CHLORIDE 0.9 % (FLUSH) 0.9 %
5-40 SYRINGE (ML) INJECTION EVERY 12 HOURS SCHEDULED
Status: CANCELLED | OUTPATIENT
Start: 2024-05-23

## 2024-05-23 RX ORDER — METOCLOPRAMIDE HYDROCHLORIDE 5 MG/ML
10 INJECTION INTRAMUSCULAR; INTRAVENOUS
Status: CANCELLED | OUTPATIENT
Start: 2024-05-23 | End: 2024-05-24

## 2024-05-23 RX ORDER — DIPHENHYDRAMINE HYDROCHLORIDE 50 MG/ML
12.5 INJECTION INTRAMUSCULAR; INTRAVENOUS
Status: CANCELLED | OUTPATIENT
Start: 2024-05-23 | End: 2024-05-24

## 2024-05-23 RX ORDER — HYDRALAZINE HYDROCHLORIDE 20 MG/ML
10 INJECTION INTRAMUSCULAR; INTRAVENOUS
Status: CANCELLED | OUTPATIENT
Start: 2024-05-23

## 2024-05-23 RX ORDER — SODIUM CHLORIDE 9 MG/ML
INJECTION, SOLUTION INTRAVENOUS PRN
Status: DISCONTINUED | OUTPATIENT
Start: 2024-05-23 | End: 2024-05-23 | Stop reason: HOSPADM

## 2024-05-23 RX ORDER — LIDOCAINE HYDROCHLORIDE 10 MG/ML
1 INJECTION, SOLUTION EPIDURAL; INFILTRATION; INTRACAUDAL; PERINEURAL
Status: DISCONTINUED | OUTPATIENT
Start: 2024-05-23 | End: 2024-05-23 | Stop reason: HOSPADM

## 2024-05-23 RX ORDER — NALOXONE HYDROCHLORIDE 0.4 MG/ML
INJECTION, SOLUTION INTRAMUSCULAR; INTRAVENOUS; SUBCUTANEOUS PRN
Status: CANCELLED | OUTPATIENT
Start: 2024-05-23

## 2024-05-23 RX ORDER — SODIUM CHLORIDE, SODIUM LACTATE, POTASSIUM CHLORIDE, CALCIUM CHLORIDE 600; 310; 30; 20 MG/100ML; MG/100ML; MG/100ML; MG/100ML
INJECTION, SOLUTION INTRAVENOUS CONTINUOUS
Status: DISCONTINUED | OUTPATIENT
Start: 2024-05-23 | End: 2024-05-23 | Stop reason: HOSPADM

## 2024-05-23 RX ORDER — MEPERIDINE HYDROCHLORIDE 50 MG/ML
12.5 INJECTION INTRAMUSCULAR; INTRAVENOUS; SUBCUTANEOUS ONCE
Status: CANCELLED | OUTPATIENT
Start: 2024-05-23 | End: 2024-05-23

## 2024-05-23 RX ORDER — MIDAZOLAM HYDROCHLORIDE 2 MG/2ML
2 INJECTION, SOLUTION INTRAMUSCULAR; INTRAVENOUS
Status: CANCELLED | OUTPATIENT
Start: 2024-05-23 | End: 2024-05-24

## 2024-05-23 RX ORDER — OXYCODONE HYDROCHLORIDE 5 MG/1
10 TABLET ORAL PRN
Status: CANCELLED | OUTPATIENT
Start: 2024-05-23 | End: 2024-05-23

## 2024-05-23 RX ADMIN — Medication 0.4 MG: at 09:17

## 2024-05-23 RX ADMIN — SODIUM CHLORIDE, POTASSIUM CHLORIDE, SODIUM LACTATE AND CALCIUM CHLORIDE: 600; 310; 30; 20 INJECTION, SOLUTION INTRAVENOUS at 08:04

## 2024-05-23 RX ADMIN — PROPOFOL 125 MCG/KG/MIN: 10 INJECTION, EMULSION INTRAVENOUS at 09:02

## 2024-05-23 ASSESSMENT — PAIN - FUNCTIONAL ASSESSMENT
PAIN_FUNCTIONAL_ASSESSMENT: 0-10
PAIN_FUNCTIONAL_ASSESSMENT: NONE - DENIES PAIN

## 2024-05-23 NOTE — H&P
[facility] General Surgery  [facility]      Patient's Name/ Date of Birth/ Gender: Nuvia Hudson / 1965 (59 y.o.) / female     Attending physician: Bobby Reyna DO    CC: colorectal cancer screening    History of present Illness: Nuvia Hudson is a 59 y.o. female, presents today for colonoscopy for:    There are no active hospital problems to display for this patient.        Colonoscopy history: No prior cscope.      Past Medical History:  has a past medical history of Allergic rhinitis, Anxiety, Cancer (HCC), Herpes simplex type 2 infection, Hypertension, and Hypothyroidism.    Past Surgical History:   Past Surgical History:   Procedure Laterality Date    BREAST SURGERY         Social History:  reports that she has never smoked. She has never used smokeless tobacco. She reports current alcohol use. She reports that she does not use drugs.    Family History: family history includes Diabetes in her paternal aunt; Hypertension in her father and mother.    Review of Systems:   General: Denies fever, chills, night sweats, weight loss, malaise, fatigue  HEENT: Denies sore throat, sinus problems, allergic rhinosinusitis  Card: Denies chest pain, palpitations, orthopnea/PND. Denies h/o murmurs  Pulm: Denies cough, shortness of breath, CHAUDHARY  GI:  per HPI; denies history of constipation, diarrhea, hematochezia or melena  : Denies polyuria, dysuria, hematuria  Endo: Denies diabetes, thyroid problems.  Heme: Denies anemia, h/o bleeding or clotting problems.   Neuro: Denies h/o CVA, TIA  Skin: Denies rashes, ulcers  Musculoskeletal: Denies muscle, joint, back pain.    Allergies: Seasonal    Current Meds:  Current Facility-Administered Medications:     lidocaine PF 1 % injection 1 mL, 1 mL, IntraDERmal, Once PRN, Lance Soria MD    lactated ringers IV soln infusion, , IntraVENous, Continuous, Lance Soria MD    sodium chloride flush 0.9 % injection 5-40 mL, 5-40 mL, IntraVENous,

## 2024-05-23 NOTE — ANESTHESIA PRE PROCEDURE
Department of Anesthesiology  Preprocedure Note       Name:  Nuvia Hudson   Age:  59 y.o.  :  1965                                          MRN:  [id number]         Date:  2024      Surgeon: Surgeon(s):  Bobby Reyna DO    Procedure: Procedure(s):  COLORECTAL CANCER SCREENING, NOT HIGH RISK    Medications prior to admission:   Prior to Admission medications    Medication Sig Start Date End Date Taking? Authorizing Provider   bisacodyl 5 MG EC tablet Take as directed by office for bowel prep 24   Bobby Reyna DO   polyethylene glycol (GLYCOLAX) 17 GM/SCOOP powder Take as directed by office for bowel prep 24   Bobby Reyna DO   levothyroxine (SYNTHROID) 75 MCG tablet take 1 tablet by mouth every morning 24   Maryanne Hendrix APRN - CNP   rosuvastatin (CRESTOR) 5 MG tablet Take 1 tablet by mouth daily 24   Provider, MD Dario   aliskiren (TEKTURNA) 300 MG tablet take 1 tablet by mouth once daily 3/29/24   Maryanne Hendrix APRN - CNP   hydroCHLOROthiazide (HYDRODIURIL) 25 MG tablet take 1 tablet by mouth every morning 3/29/24   Maryanne Hendrix APRN - CNP   mometasone (NASONEX) 50 MCG/ACT nasal spray instill 2 sprays into each nostril every morning 3/5/24   Maryanne Hendrix APRN - CNP   nebivolol (BYSTOLIC) 20 MG TABS tablet take 1 tablet by mouth every morning 24   Maryanne Hendrix APRN - CNP   valACYclovir (VALTREX) 500 MG tablet take 1 tablet by mouth once daily if needed for HSV FLARE 23   Maryanne Hendrix APRN - CNP       Current medications:    Current Facility-Administered Medications   Medication Dose Route Frequency Provider Last Rate Last Admin    lidocaine PF 1 % injection 1 mL  1 mL IntraDERmal Once PRN Lance Soria MD        lactated ringers IV soln infusion   IntraVENous Continuous Lance Soria MD        sodium chloride flush 0.9 % injection 5-40 mL  5-40 mL IntraVENous 2 times per day Lance Soria MD        sodium

## 2024-05-23 NOTE — ANESTHESIA POSTPROCEDURE EVALUATION
Department of Anesthesiology  Postprocedure Note    Patient: Nuvia Hudson  MRN: [id number]  YOB: 1965  Date of evaluation: 5/23/2024    Procedure Summary       Date: 05/23/24 Room / Location: [address]    Anesthesia Start: 0859 Anesthesia Stop: 0946    Procedure: COLONOSCOPY ASCENDING COLON POLYPECTOMY SNARE BIOPSY Diagnosis:       Screening for colon cancer      (Screening for colon cancer [Z12.11])    Surgeons: Bobby Reyna DO Responsible Provider: Jamila Fuller MD    Anesthesia Type: MAC ASA Status: 2            Anesthesia Type: No value filed.    Nicole Phase I: Nicole Score: 10    Nicole Phase II: Nicole Score: 9    Anesthesia Post Evaluation    Patient location during evaluation: PACU  Patient participation: complete - patient participated  Level of consciousness: awake and alert  Airway patency: patent  Nausea & Vomiting: no nausea and no vomiting  Cardiovascular status: blood pressure returned to baseline  Respiratory status: acceptable and room air  Hydration status: euvolemic  Pain management: adequate and satisfactory to patient    No notable events documented.

## 2024-05-23 NOTE — OP NOTE
Operative Note      Patient: Nuvia Hudson  YOB: 1965  MRN: [id number]    Date of Procedure: 5/23/2024    Pre-Op Diagnosis Codes:     * Screening for colon cancer [Z12.11]    Post-Op Diagnosis:   Ascending polyp 2cm  Pandiverticulosis  Internal hemorrhoids       Procedure(s):  COLONOSCOPY ASCENDING COLON POLYPECTOMY SNARE BIOPSY    Surgeon(s):  Bobby Reyna DO    Assistant:   Resident: Doris Kelly MD    Anesthesia: Monitor Anesthesia Care    Estimated Blood Loss (mL): Minimal    Complications: None    Specimens:   ID Type Source Tests Collected by Time Destination   A : Ascending colon polyp Tissue Tissue SURGICAL PATHOLOGY Bobby Reyna DO 5/23/2024 0921        Implants:  * No implants in log *      Drains: * No LDAs found *    Findings:  Infection Present At Time Of Surgery (PATOS) (choose all levels that have infection present):  No infection present  Other Findings: as above    Detailed Description of Procedure:       INDICATIONS FOR PROCEDURE:   The patient is a 59 y.o. year old female with history of above preop diagnosis.  Colonoscopy with possible biopsy or polypectomy was recommend, the risk, benefits, expected outcome, and alternatives to the procedure were explained.  Risks included but are not limited to bleeding, infection, respiratory distress, hypotension, and perforation of the colon.  The patient understands and is in agreement.      PROCEDURE DETAILS:  Patient was brought to the endoscopy suite and placed in the left lateral recumbent position. A time out was completed verifying correct patient, procedure and equipment. Anesthesia was induced using MAC guidelines.  A digital rectal exam was performed. The colonoscope was inserted into the rectum without difficulty and the scope was advanced to the cecum which was identified by anatomy and by palpation. Bowel prep was adequate. The scope was slowly withdrawn visualizing the cecum, ascending colon, transverse colon, descending  colon, sigmoid colon and rectum. The scope was withdrawn to the rectal vault and then retroflexed. The scope was then straightened and removed. The patient tolerated the procedure well and was transferred to the recovery unit in stable condition.    Findings:    Polyps:   2cm polyp proximal ascending colon removed with hot snare      Other findings:   internal hemorrhoids and pandiverticulosis  Prolonged case time >20min due to difficult anatomy      Greater than 9 minutes was spent withdrawing the colonoscope.      IMPRESSION/PLAN:   Increase dietary fiber and water  Patient is advised to have a repeat colonoscopy in 1 years unless otherwise dictated by pathology  Colonoscopy sooner if blood in the stool, unexplained weight loss, or change in the bowels        Electronically signed by Bobby Reyna DO on 5/23/2024 at 9:37 AM

## 2024-05-28 LAB — SURGICAL PATHOLOGY REPORT: NORMAL

## 2024-07-15 DIAGNOSIS — J30.2 SEASONAL ALLERGIES: ICD-10-CM

## 2024-07-15 RX ORDER — MOMETASONE FUROATE MONOHYDRATE 50 UG/1
SPRAY, METERED NASAL
Qty: 17 G | Refills: 3 | Status: SHIPPED | OUTPATIENT
Start: 2024-07-15

## 2024-07-15 RX ORDER — ALISKIREN 300 MG/1
300 TABLET, FILM COATED ORAL DAILY
Qty: 90 TABLET | Refills: 1 | Status: SHIPPED | OUTPATIENT
Start: 2024-07-15

## 2024-07-15 NOTE — TELEPHONE ENCOUNTER
Last Visit Date: 4/12/2024   Next Visit Date: 10/18/2024   Pharmacy: [facility]

## 2024-08-13 DIAGNOSIS — I10 ESSENTIAL HYPERTENSION: ICD-10-CM

## 2024-08-13 RX ORDER — NEBIVOLOL 20 MG/1
TABLET ORAL
Qty: 30 TABLET | Refills: 11 | Status: SHIPPED | OUTPATIENT
Start: 2024-08-13

## 2024-10-08 DIAGNOSIS — J30.2 SEASONAL ALLERGIES: ICD-10-CM

## 2024-10-09 RX ORDER — MOMETASONE FUROATE MONOHYDRATE 50 UG/1
SPRAY, METERED NASAL
Qty: 17 G | Refills: 3 | Status: SHIPPED | OUTPATIENT
Start: 2024-10-09

## 2024-10-09 RX ORDER — HYDROCHLOROTHIAZIDE 25 MG/1
25 TABLET ORAL EVERY MORNING
Qty: 30 TABLET | Refills: 2 | Status: SHIPPED | OUTPATIENT
Start: 2024-10-09

## 2024-10-18 ENCOUNTER — OFFICE VISIT (OUTPATIENT)
Dept: PRIMARY CARE CLINIC | Age: 59
End: 2024-10-18

## 2024-10-18 VITALS
WEIGHT: 184.6 LBS | OXYGEN SATURATION: 99 % | DIASTOLIC BLOOD PRESSURE: 88 MMHG | HEART RATE: 68 BPM | BODY MASS INDEX: 32.71 KG/M2 | HEIGHT: 63 IN | SYSTOLIC BLOOD PRESSURE: 130 MMHG | RESPIRATION RATE: 16 BRPM

## 2024-10-18 DIAGNOSIS — Z78.0 POSTMENOPAUSAL: ICD-10-CM

## 2024-10-18 DIAGNOSIS — E03.9 ACQUIRED HYPOTHYROIDISM: ICD-10-CM

## 2024-10-18 DIAGNOSIS — F41.9 ANXIETY: ICD-10-CM

## 2024-10-18 DIAGNOSIS — I10 ESSENTIAL HYPERTENSION: Primary | ICD-10-CM

## 2024-10-18 DIAGNOSIS — Z13.0 SCREENING, ANEMIA, DEFICIENCY, IRON: ICD-10-CM

## 2024-10-18 RX ORDER — ALPRAZOLAM 1 MG
1 TABLET ORAL DAILY PRN
Qty: 20 TABLET | Refills: 2 | Status: SHIPPED | OUTPATIENT
Start: 2024-10-18 | End: 2024-12-17

## 2024-10-18 RX ORDER — LEVOTHYROXINE SODIUM 75 UG/1
75 TABLET ORAL EVERY MORNING
Qty: 90 TABLET | Refills: 1 | Status: SHIPPED | OUTPATIENT
Start: 2024-10-18

## 2024-10-18 RX ORDER — ALPRAZOLAM 1 MG
1 TABLET ORAL NIGHTLY PRN
COMMUNITY
End: 2024-10-18

## 2024-10-18 SDOH — ECONOMIC STABILITY: INCOME INSECURITY: HOW HARD IS IT FOR YOU TO PAY FOR THE VERY BASICS LIKE FOOD, HOUSING, MEDICAL CARE, AND HEATING?: NOT HARD AT ALL

## 2024-10-18 SDOH — ECONOMIC STABILITY: FOOD INSECURITY: WITHIN THE PAST 12 MONTHS, THE FOOD YOU BOUGHT JUST DIDN'T LAST AND YOU DIDN'T HAVE MONEY TO GET MORE.: NEVER TRUE

## 2024-10-18 SDOH — ECONOMIC STABILITY: FOOD INSECURITY: WITHIN THE PAST 12 MONTHS, YOU WORRIED THAT YOUR FOOD WOULD RUN OUT BEFORE YOU GOT MONEY TO BUY MORE.: NEVER TRUE

## 2024-10-18 ASSESSMENT — PATIENT HEALTH QUESTIONNAIRE - PHQ9
SUM OF ALL RESPONSES TO PHQ QUESTIONS 1-9: 0
1. LITTLE INTEREST OR PLEASURE IN DOING THINGS: NOT AT ALL
SUM OF ALL RESPONSES TO PHQ QUESTIONS 1-9: 0
SUM OF ALL RESPONSES TO PHQ QUESTIONS 1-9: 0
SUM OF ALL RESPONSES TO PHQ9 QUESTIONS 1 & 2: 0
SUM OF ALL RESPONSES TO PHQ QUESTIONS 1-9: 0
2. FEELING DOWN, DEPRESSED OR HOPELESS: NOT AT ALL

## 2024-10-18 ASSESSMENT — ENCOUNTER SYMPTOMS
DIARRHEA: 0
NAUSEA: 0
SORE THROAT: 0
SHORTNESS OF BREATH: 0
ABDOMINAL PAIN: 0
COLOR CHANGE: 0
VOMITING: 0
CHEST TIGHTNESS: 0
RHINORRHEA: 0

## 2024-10-18 NOTE — PROGRESS NOTES
[facility]  [facility] PRIMARY CARE  [address] [place] [address]   [address]  [facility] [id number]  Dept: [phone]  Dept Fax: [phone]    Nuvia Hudson is a 59 y.o. female who presentstoday for her medical conditions/complaints as noted below.  Nuvia Hudson is c/o of  Chief Complaint   Patient presents with    Annual Exam         HPI:     Presents for annual exam  HTN well controlled with aliskiren-HCTZ 300-25 mg daily- follows with cardiology, Dr. Gutierrez  Palpitations well controlled with bystolic 20mg daily  Denies CP, SOB, leg swelling or HA  On rosuvastatin 5 mg daily   Hypothyroidism maintained on levothyroxine 75mcg- open order for labs to update, she reports feeling stable    Follows with GYN, had orders for hormone labs last year and did not have them   We did discuss bio identical hormone at [facility], she may consider she continues to complain of occultly losing weight and having abdominal weight gain despite no change in diet or activity after menopause  Is exercising regularly tries to limit carbs in the diet    Anxiety is stable with Xanax as needed, rarely needing.  Still has some pills leftover from previous prescription that are         No results found for: \"LABA1C\"          ( goal A1C is < 7)   No components found for: \"LABMICR\"  No components found for: \"LDLCHOLESTEROL\", \"LDLCALC\"    (goal LDL is <100)   AST (U/L)   Date Value   2023 36 (H)     ALT (U/L)   Date Value   2023 28     BUN (mg/dL)   Date Value   2023 19     BP Readings from Last 3 Encounters:   10/18/24 130/88   24 126/73   24 124/74          ([id number])    Past Medical History:   Diagnosis Date    Allergic rhinitis     Anxiety     Cancer (HCC) Melanoma, removed 2024    Herpes simplex type 2 infection     Hypertension     Hypothyroidism       Past Surgical History:   Procedure Laterality Date    BREAST SURGERY      COLONOSCOPY N/A 2024     Date: 9/11/2023    Time: 5:07 PM    Patient Placed On BIPAP/CPAP/ Non-Invasive Ventilation? Yes    If no must comment. Facial area red/color change? No           If YES are Blister/Lesion present? No   If yes must notify nursing staff  BIPAP/CPAP skin barrier?   Yes    Skin barrier type:mepilexlite          09/11/23 1705   NIV Type   Ventilator ID [id number]   NIV Started/Stopped On   Mode AVAPS   Mask Type Full face mask   Mask Size Small   Assessment   Comfort Level Good   Using Accessory Muscles No   Mask Compliance Good   Skin Assessment Clean, dry, & intact   Breath Sounds   Breath Sounds Bilateral Diminished   Right Upper Lobe Diminished   Right Middle Lobe Diminished   Right Lower Lobe Diminished   Left Upper Lobe Diminished   Left Lower Lobe Diminished   Settings/Measurements   PIP Observed 21 cm H20   CPAP/EPAP 8 cmH2O   IPAP Min 15 cmH2O   IPAP Max 25 cmH2O   Vt (Set, mL) 400 mL   Vt (Measured) 420 mL   Rate Ordered 16   FiO2  50 %   I Time/ I Time % 0.9 s   Minute Volume (L/min) 8 Liters   Mask Leak (lpm) 36 lpm   Patient's Home Machine No   Alarm Settings   Alarms On Y   Low Pressure (cmH2O) 10 cmH2O   High Pressure (cmH2O) 30 cmH2O   Apnea (secs) 20 secs   RR Low (bpm) 15   RR High (bpm) 40 br/min             Chelsie Vargas RCP

## 2024-10-21 ENCOUNTER — TELEPHONE (OUTPATIENT)
Dept: OBGYN CLINIC | Age: 59
End: 2024-10-21

## 2024-10-21 NOTE — TELEPHONE ENCOUNTER
Pt calling and was wondering  if the lab orders from July 11 2023 can be put back in so pt can get labs done. Thank you

## 2024-10-22 NOTE — TELEPHONE ENCOUNTER
Called and spoke to pt    Patient is scheduled with Marianna 1/28/24 @ 2:30pm    Pt was wondering if labs can be sent in before or does she have to wait

## 2024-11-05 ENCOUNTER — TELEMEDICINE (OUTPATIENT)
Dept: OBGYN CLINIC | Age: 59
End: 2024-11-05
Payer: COMMERCIAL

## 2024-11-05 DIAGNOSIS — E34.9 HORMONE IMBALANCE: Primary | ICD-10-CM

## 2024-11-05 DIAGNOSIS — Z78.0 POSTMENOPAUSAL: ICD-10-CM

## 2024-11-05 DIAGNOSIS — R63.5 WEIGHT GAIN: ICD-10-CM

## 2024-11-05 DIAGNOSIS — E66.9 OBESITY, UNSPECIFIED CLASS, UNSPECIFIED OBESITY TYPE, UNSPECIFIED WHETHER SERIOUS COMORBIDITY PRESENT: ICD-10-CM

## 2024-11-05 PROCEDURE — G8484 FLU IMMUNIZE NO ADMIN: HCPCS | Performed by: NURSE PRACTITIONER

## 2024-11-05 PROCEDURE — G8417 CALC BMI ABV UP PARAM F/U: HCPCS | Performed by: NURSE PRACTITIONER

## 2024-11-05 PROCEDURE — 3017F COLORECTAL CA SCREEN DOC REV: CPT | Performed by: NURSE PRACTITIONER

## 2024-11-05 PROCEDURE — 99214 OFFICE O/P EST MOD 30 MIN: CPT | Performed by: NURSE PRACTITIONER

## 2024-11-05 PROCEDURE — G8427 DOCREV CUR MEDS BY ELIG CLIN: HCPCS | Performed by: NURSE PRACTITIONER

## 2024-11-05 PROCEDURE — 1036F TOBACCO NON-USER: CPT | Performed by: NURSE PRACTITIONER

## 2024-11-05 ASSESSMENT — ENCOUNTER SYMPTOMS
WHEEZING: 0
SHORTNESS OF BREATH: 0
NAUSEA: 0
COLOR CHANGE: 0
VOMITING: 0

## 2024-11-05 NOTE — PROGRESS NOTES
change, appetite change, chills and fever.   Respiratory:  Negative for shortness of breath and wheezing.    Cardiovascular:  Negative for chest pain and leg swelling.   Gastrointestinal:  Negative for nausea and vomiting.   Endocrine: Positive for heat intolerance. Negative for cold intolerance.   Genitourinary:  Positive for dyspareunia. Negative for pelvic pain, vaginal bleeding, vaginal discharge and vaginal pain.   Skin:  Negative for color change and pallor.   Neurological:  Negative for dizziness and headaches.   Hematological:  Negative for adenopathy. Does not bruise/bleed easily.   Psychiatric/Behavioral:  Negative for self-injury and suicidal ideas.           Objective   Patient-Reported Vitals  No data recorded     Physical Exam  [INSTRUCTIONS:  \"[x]\" Indicates a positive item  \"[]\" Indicates a negative item  -- DELETE ALL ITEMS NOT EXAMINED]    Constitutional: [x] Appears well-developed and well-nourished [x] No apparent distress      [] Abnormal -     Mental status: [x] Alert and awake  [x] Oriented to person/place/time [x] Able to follow commands    [] Abnormal -     Eyes:   EOM    [x]  Normal    [] Abnormal -   Sclera  [x]  Normal    [] Abnormal -          Discharge [x]  None visible   [] Abnormal -     HENT: [x] Normocephalic, atraumatic  [] Abnormal -   [x] Mouth/Throat: Mucous membranes are moist    External Ears [x] Normal  [] Abnormal -    Neck: [x] No visualized mass [] Abnormal -     Pulmonary/Chest: [x] Respiratory effort normal   [x] No visualized signs of difficulty breathing or respiratory distress        [] Abnormal -      Musculoskeletal:   [x] Normal gait with no signs of ataxia         [x] Normal range of motion of neck        [] Abnormal -     Neurological:        [x] No Facial Asymmetry (Cranial nerve 7 motor function) (limited exam due to video visit)          [x] No gaze palsy        [] Abnormal -          Skin:        [x] No significant exanthematous lesions or discoloration noted

## 2024-11-14 ENCOUNTER — HOSPITAL ENCOUNTER (OUTPATIENT)
Age: 59
Setting detail: SPECIMEN
Discharge: HOME | End: 2024-11-14

## 2024-11-14 DIAGNOSIS — Z78.0 POSTMENOPAUSAL: ICD-10-CM

## 2024-11-14 DIAGNOSIS — Z13.0 SCREENING, ANEMIA, DEFICIENCY, IRON: ICD-10-CM

## 2024-11-14 DIAGNOSIS — E03.9 ACQUIRED HYPOTHYROIDISM: ICD-10-CM

## 2024-11-14 DIAGNOSIS — E34.9 HORMONE IMBALANCE: ICD-10-CM

## 2024-11-14 LAB
25(OH)D3 SERPL-MCNC: 61.5 NG/ML (ref 30–100)
ALBUMIN SERPL-MCNC: 5.1 G/DL (ref 3.5–5.2)
ALBUMIN/GLOB SERPL: 1.7 {RATIO} (ref 1–2.5)
ALP SERPL-CCNC: 75 U/L (ref 35–104)
ALT SERPL-CCNC: 28 U/L (ref 10–35)
ANION GAP SERPL CALCULATED.3IONS-SCNC: 12 MMOL/L (ref 9–16)
AST SERPL-CCNC: 32 U/L (ref 10–35)
BASOPHILS # BLD: 0.08 K/UL (ref 0–0.2)
BASOPHILS NFR BLD: 1 % (ref 0–2)
BILIRUB SERPL-MCNC: 0.5 MG/DL (ref 0–1.2)
BUN SERPL-MCNC: 16 MG/DL (ref 6–20)
CALCIUM SERPL-MCNC: 10.2 MG/DL (ref 8.6–10.4)
CHLORIDE SERPL-SCNC: 100 MMOL/L (ref 98–107)
CHOLEST SERPL-MCNC: 186 MG/DL (ref 0–199)
CHOLESTEROL/HDL RATIO: 2.3
CK SERPL-CCNC: 113 U/L (ref 26–192)
CO2 SERPL-SCNC: 26 MMOL/L (ref 20–31)
CORTIS SERPL-MCNC: 10.3 UG/DL (ref 2.5–19.5)
CORTISOL COLLECTION INFO: 729
CREAT SERPL-MCNC: 0.9 MG/DL (ref 0.6–0.9)
DHEA-S SERPL-MCNC: 85 UG/DL (ref 18.9–205)
EOSINOPHIL # BLD: 0.06 K/UL (ref 0–0.44)
EOSINOPHILS RELATIVE PERCENT: 1 % (ref 1–4)
ERYTHROCYTE [DISTWIDTH] IN BLOOD BY AUTOMATED COUNT: 12.7 % (ref 11.8–14.4)
EST. AVERAGE GLUCOSE BLD GHB EST-MCNC: 114 MG/DL
ESTRADIOL LEVEL: 9 PG/ML
GFR, ESTIMATED: 74 ML/MIN/1.73M2
GLUCOSE SERPL-MCNC: 86 MG/DL (ref 74–99)
HBA1C MFR BLD: 5.6 % (ref 4–6)
HCT VFR BLD AUTO: 42 % (ref 36.3–47.1)
HDLC SERPL-MCNC: 81 MG/DL
HGB BLD-MCNC: 13.9 G/DL (ref 11.9–15.1)
IMM GRANULOCYTES # BLD AUTO: <0.03 K/UL (ref 0–0.3)
IMM GRANULOCYTES NFR BLD: 0 %
INSULIN REFERENCE RANGE:: NORMAL
INSULIN: 6.4 MU/L
LDLC SERPL CALC-MCNC: 89 MG/DL (ref 0–100)
LYMPHOCYTES NFR BLD: 1.96 K/UL (ref 1.1–3.7)
LYMPHOCYTES RELATIVE PERCENT: 30 % (ref 24–43)
MCH RBC QN AUTO: 31.1 PG (ref 25.2–33.5)
MCHC RBC AUTO-ENTMCNC: 33.1 G/DL (ref 28.4–34.8)
MCV RBC AUTO: 94 FL (ref 82.6–102.9)
MONOCYTES NFR BLD: 0.71 K/UL (ref 0.1–1.2)
MONOCYTES NFR BLD: 11 % (ref 3–12)
NEUTROPHILS NFR BLD: 57 % (ref 36–65)
NEUTS SEG NFR BLD: 3.67 K/UL (ref 1.5–8.1)
NRBC BLD-RTO: 0 PER 100 WBC
PLATELET # BLD AUTO: 291 K/UL (ref 138–453)
PMV BLD AUTO: 11.1 FL (ref 8.1–13.5)
POTASSIUM SERPL-SCNC: 4.6 MMOL/L (ref 3.7–5.3)
PROGEST SERPL-MCNC: 0.06 NG/ML
PROT SERPL-MCNC: 8.1 G/DL (ref 6.6–8.7)
RBC # BLD AUTO: 4.47 M/UL (ref 3.95–5.11)
SHBG SERPL-SCNC: 52 NMOL/L (ref 17–125)
SODIUM SERPL-SCNC: 138 MMOL/L (ref 136–145)
T4 FREE SERPL-MCNC: 1.3 NG/DL (ref 0.92–1.68)
TESTOST FREE MFR SERPL: 2.1 PG/ML (ref 0.6–3.8)
TESTOST SERPL-MCNC: 16 NG/DL (ref 3–41)
TESTOSTERONE, BIOAVAILABLE: 5 NG/DL (ref 1.5–9.4)
TRIGL SERPL-MCNC: 79 MG/DL (ref 0–149)
TSH SERPL DL<=0.05 MIU/L-ACNC: 1.67 UIU/ML (ref 0.27–4.2)
TSH SERPL DL<=0.05 MIU/L-ACNC: 1.69 UIU/ML (ref 0.27–4.2)
VLDLC SERPL CALC-MCNC: 16 MG/DL (ref 1–30)
WBC OTHER # BLD: 6.5 K/UL (ref 3.5–11.3)

## 2024-11-20 LAB — ESTRONE SERPL-MCNC: 20.1 PG/ML

## 2024-12-10 RX ORDER — ALISKIREN 300 MG/1
300 TABLET, FILM COATED ORAL DAILY
Qty: 30 TABLET | Refills: 1 | Status: SHIPPED | OUTPATIENT
Start: 2024-12-10

## 2024-12-10 RX ORDER — HYDROCHLOROTHIAZIDE 25 MG/1
25 TABLET ORAL EVERY MORNING
Qty: 30 TABLET | Refills: 2 | Status: SHIPPED | OUTPATIENT
Start: 2024-12-10

## 2025-01-10 DIAGNOSIS — E03.9 ACQUIRED HYPOTHYROIDISM: ICD-10-CM

## 2025-01-10 RX ORDER — LEVOTHYROXINE SODIUM 75 MCG
75 TABLET ORAL EVERY MORNING
Qty: 30 TABLET | Refills: 2 | Status: SHIPPED | OUTPATIENT
Start: 2025-01-10

## 2025-01-28 ENCOUNTER — OFFICE VISIT (OUTPATIENT)
Dept: OBGYN CLINIC | Age: 60
End: 2025-01-28
Payer: COMMERCIAL

## 2025-01-28 ENCOUNTER — HOSPITAL ENCOUNTER (OUTPATIENT)
Age: 60
Setting detail: SPECIMEN
Discharge: HOME | End: 2025-01-28

## 2025-01-28 VITALS
WEIGHT: 183 LBS | BODY MASS INDEX: 31.24 KG/M2 | DIASTOLIC BLOOD PRESSURE: 80 MMHG | SYSTOLIC BLOOD PRESSURE: 126 MMHG | HEIGHT: 64 IN

## 2025-01-28 DIAGNOSIS — Z01.419 WELL WOMAN EXAM WITH ROUTINE GYNECOLOGICAL EXAM: Primary | ICD-10-CM

## 2025-01-28 DIAGNOSIS — Z13.820 SCREENING FOR OSTEOPOROSIS: ICD-10-CM

## 2025-01-28 DIAGNOSIS — Z78.0 POSTMENOPAUSAL: ICD-10-CM

## 2025-01-28 DIAGNOSIS — Z12.31 VISIT FOR SCREENING MAMMOGRAM: ICD-10-CM

## 2025-01-28 PROCEDURE — 99396 PREV VISIT EST AGE 40-64: CPT | Performed by: NURSE PRACTITIONER

## 2025-01-28 PROCEDURE — 3079F DIAST BP 80-89 MM HG: CPT | Performed by: NURSE PRACTITIONER

## 2025-01-28 PROCEDURE — 3074F SYST BP LT 130 MM HG: CPT | Performed by: NURSE PRACTITIONER

## 2025-01-28 SDOH — ECONOMIC STABILITY: FOOD INSECURITY: WITHIN THE PAST 12 MONTHS, YOU WORRIED THAT YOUR FOOD WOULD RUN OUT BEFORE YOU GOT MONEY TO BUY MORE.: PATIENT DECLINED

## 2025-01-28 SDOH — ECONOMIC STABILITY: FOOD INSECURITY: WITHIN THE PAST 12 MONTHS, THE FOOD YOU BOUGHT JUST DIDN'T LAST AND YOU DIDN'T HAVE MONEY TO GET MORE.: PATIENT DECLINED

## 2025-01-28 ASSESSMENT — ENCOUNTER SYMPTOMS
VOMITING: 0
SHORTNESS OF BREATH: 0
NAUSEA: 0
COUGH: 0
COLOR CHANGE: 0

## 2025-01-28 ASSESSMENT — PATIENT HEALTH QUESTIONNAIRE - PHQ9: DEPRESSION UNABLE TO ASSESS: PT REFUSES

## 2025-01-28 NOTE — PROGRESS NOTES
Nuvia Hudson is a 59 y.o.  here for her annual exam.  The patient was seen and examined.The patients past medical, surgical, social and family history were reviewed.  Current medications and allergies were reviewed, and documented in the chart.      Works [employer]  one child and 6 year old grandson.      Tobacco abuse No     Last PAP: - NILM, neg HPV  hx of abnormal PAP yes - in her 20s believes possibly had Colposcopy no hx of LEEP  Family hx uterine or ovarian cancer-denies  Last mammogram if indicated-2024-negative, Family hx of breast cancer -denies  Last Dexa scan if indicated- has never had denies hx bone fracture  Colon cancer screening if indicated- colonoscopy May 2024- + polyp was recommended repeat in 1 year family hx colon cancer -denies           Sexually active: yes ,  Dyspareunia: No, Vaginal discharge: no,  UTI symptoms: no, voiding difficulties: no, bowels regular:Yes bloating:no     Uses valtrex PRN for genital HSV flare up.      Menstrual history:  LMP: , no PMB    She had a virtual visit in 2024 for concerns of hormone imbalance postmenopausal weight gain and was considering compound hormone placement therapy.  She completed labs and they were faxed to compound pharmacy but she never proceeded with follow-up to compound pharmacy.  She is still debating if she wants to at this time.    Denies personal HX of VTE, CVA/TIA,  endometrial cancer/hyperplasia, or breast cancer.    She has hx HTN on medication/contolled     OB History    Para Term  AB Living   1 1           SAB IAB Ectopic Molar Multiple Live Births                    # Outcome Date GA Lbr Jax/2nd Weight Sex Type Anes PTL Lv   1 Para                Vitals:    25 1450   BP: 126/80   Site: Left Upper Arm   Position: Sitting   Cuff Size: Medium Adult   Weight: 83 kg (183 lb)   Height: 1.626 m (5' 4\")       Wt Readings from Last 3 Encounters:   25 83 kg (183 lb)   10/18/24

## 2025-01-28 NOTE — PATIENT INSTRUCTIONS
Preventing Osteoporosis: Care Instructions  Your Care Instructions    Osteoporosis means the bones are weak and thin enough that they can break easily. The older you are, the more likely you are to get osteoporosis. But with plenty of calcium, vitamin D, and exercise, you can help prevent osteoporosis.  The preteen and teen years are a key time for bone building. With the help of calcium, vitamin D, and exercise in those early years and beyond, the bones reach their peak density and strength by age 30. After age 30, your bones naturally start to thin and weaken.  The stronger your bones are at around age 30, the lower your risk for osteoporosis. But no matter what your age and risk are, your bones still need calcium, vitamin D, and exercise to stay strong. Also avoid smoking, and limit alcohol. Smoking and heavy alcohol use can make your bones thinner.  Talk to your doctor about any special risks you might have, such as having a close relative with osteoporosis or taking a medicine that can weaken bones. Your doctor can tell you the best ways to protect your bones from thinning.  Follow-up care is a key part of your treatment and safety. Be sure to make and go to all appointments, and call your doctor if you are having problems. It's also a good idea to know your test results and keep a list of the medicines you take.  How can you care for yourself at home?  Get enough calcium and vitamin D. The [employer] recommends adults younger than age 51 need 1,000 mg of calcium and 600 IU of vitamin D each day. Women ages 51 to 70 need 1,200 mg of calcium and 600 IU of vitamin D each day. Men ages 51 to 70 need 1,000 mg of calcium and 600 IU of vitamin D each day. Adults 71 and older need 1,200 mg of calcium and 800 IU of vitamin D each day.  Eat foods rich in calcium, like yogurt, cheese, milk, and dark green vegetables.  Eat foods rich in vitamin D, like eggs, fatty fish, cereal, and fortified milk.  Get some

## 2025-02-06 LAB — CYTOLOGY REPORT: NORMAL

## 2025-02-07 DIAGNOSIS — J30.2 SEASONAL ALLERGIES: ICD-10-CM

## 2025-02-07 RX ORDER — ALISKIREN 300 MG/1
300 TABLET, FILM COATED ORAL DAILY
Qty: 30 TABLET | Refills: 2 | Status: SHIPPED | OUTPATIENT
Start: 2025-02-07

## 2025-02-07 RX ORDER — MOMETASONE FUROATE MONOHYDRATE 50 UG/1
SPRAY, METERED NASAL
Qty: 17 G | Refills: 4 | Status: SHIPPED | OUTPATIENT
Start: 2025-02-07

## 2025-03-04 ENCOUNTER — PATIENT MESSAGE (OUTPATIENT)
Dept: PRIMARY CARE CLINIC | Age: 60
End: 2025-03-04

## 2025-03-04 DIAGNOSIS — B00.9 HSV (HERPES SIMPLEX VIRUS) INFECTION: ICD-10-CM

## 2025-03-04 RX ORDER — VALACYCLOVIR HYDROCHLORIDE 500 MG/1
TABLET, FILM COATED ORAL
Qty: 10 TABLET | Refills: 1 | Status: SHIPPED | OUTPATIENT
Start: 2025-03-04

## 2025-03-04 NOTE — TELEPHONE ENCOUNTER
Last Visit Date: 10/18/2024   Next Visit Date: Visit date not found   Pharmacy:     [facility] [place] [address] - P [phone] - F [phone]  [address]  [facility] [address]  [place] [id number]  Phone: [phone] Fax: [phone]

## 2025-03-06 RX ORDER — HYDROCHLOROTHIAZIDE 25 MG/1
25 TABLET ORAL EVERY MORNING
Qty: 30 TABLET | Refills: 3 | Status: SHIPPED | OUTPATIENT
Start: 2025-03-06

## 2025-04-08 DIAGNOSIS — B00.9 HSV (HERPES SIMPLEX VIRUS) INFECTION: ICD-10-CM

## 2025-04-08 RX ORDER — VALACYCLOVIR HYDROCHLORIDE 500 MG/1
TABLET, FILM COATED ORAL
Qty: 10 TABLET | Refills: 2 | Status: SHIPPED | OUTPATIENT
Start: 2025-04-08

## 2025-05-10 RX ORDER — ALISKIREN 300 MG/1
300 TABLET, FILM COATED ORAL DAILY
Qty: 30 TABLET | Refills: 3 | Status: SHIPPED | OUTPATIENT
Start: 2025-05-10

## 2025-06-13 ENCOUNTER — PATIENT MESSAGE (OUTPATIENT)
Dept: PRIMARY CARE CLINIC | Age: 60
End: 2025-06-13

## 2025-06-13 ENCOUNTER — TELEPHONE (OUTPATIENT)
Dept: GASTROENTEROLOGY | Age: 60
End: 2025-06-13

## 2025-06-13 DIAGNOSIS — D36.9 TUBULOVILLOUS ADENOMA: Primary | ICD-10-CM

## 2025-07-10 DIAGNOSIS — J30.2 SEASONAL ALLERGIES: ICD-10-CM

## 2025-07-10 RX ORDER — HYDROCHLOROTHIAZIDE 25 MG/1
25 TABLET ORAL EVERY MORNING
Qty: 30 TABLET | Refills: 3 | Status: SHIPPED | OUTPATIENT
Start: 2025-07-10

## 2025-07-10 RX ORDER — MOMETASONE FUROATE MONOHYDRATE 50 UG/1
SPRAY, METERED NASAL
Qty: 17 G | Refills: 4 | Status: SHIPPED | OUTPATIENT
Start: 2025-07-10

## 2025-07-11 DIAGNOSIS — I10 ESSENTIAL HYPERTENSION: ICD-10-CM

## 2025-07-11 RX ORDER — NEBIVOLOL 20 MG/1
1 TABLET ORAL EVERY MORNING
Qty: 30 TABLET | Refills: 11 | Status: SHIPPED | OUTPATIENT
Start: 2025-07-11 | End: 2025-07-13 | Stop reason: SDUPTHER

## 2025-07-13 ENCOUNTER — TELEPHONE (OUTPATIENT)
Dept: PRIMARY CARE CLINIC | Age: 60
End: 2025-07-13

## 2025-07-13 DIAGNOSIS — I10 ESSENTIAL HYPERTENSION: Primary | ICD-10-CM

## 2025-07-13 RX ORDER — NEBIVOLOL 20 MG/1
1 TABLET ORAL EVERY MORNING
Qty: 30 TABLET | Refills: 0 | Status: SHIPPED | OUTPATIENT
Start: 2025-07-13

## 2025-08-25 ENCOUNTER — OFFICE VISIT (OUTPATIENT)
Dept: GASTROENTEROLOGY | Age: 60
End: 2025-08-25
Payer: COMMERCIAL

## 2025-08-25 VITALS
HEART RATE: 71 BPM | OXYGEN SATURATION: 97 % | DIASTOLIC BLOOD PRESSURE: 85 MMHG | WEIGHT: 181 LBS | BODY MASS INDEX: 30.9 KG/M2 | SYSTOLIC BLOOD PRESSURE: 141 MMHG | HEIGHT: 64 IN

## 2025-08-25 DIAGNOSIS — Z86.0101 HX OF ADENOMATOUS COLONIC POLYPS: Primary | ICD-10-CM

## 2025-08-25 DIAGNOSIS — F10.90 ALCOHOL USE: ICD-10-CM

## 2025-08-25 DIAGNOSIS — D36.9 TUBULOVILLOUS ADENOMA: ICD-10-CM

## 2025-08-25 PROBLEM — C43.61: Status: ACTIVE | Noted: 2024-04-09

## 2025-08-25 PROBLEM — Z12.31 ENCOUNTER FOR SCREENING MAMMOGRAM FOR MALIGNANT NEOPLASM OF BREAST: Status: ACTIVE | Noted: 2024-06-12

## 2025-08-25 PROCEDURE — 3077F SYST BP >= 140 MM HG: CPT | Performed by: STUDENT IN AN ORGANIZED HEALTH CARE EDUCATION/TRAINING PROGRAM

## 2025-08-25 PROCEDURE — G8417 CALC BMI ABV UP PARAM F/U: HCPCS | Performed by: STUDENT IN AN ORGANIZED HEALTH CARE EDUCATION/TRAINING PROGRAM

## 2025-08-25 PROCEDURE — 3017F COLORECTAL CA SCREEN DOC REV: CPT | Performed by: STUDENT IN AN ORGANIZED HEALTH CARE EDUCATION/TRAINING PROGRAM

## 2025-08-25 PROCEDURE — 1036F TOBACCO NON-USER: CPT | Performed by: STUDENT IN AN ORGANIZED HEALTH CARE EDUCATION/TRAINING PROGRAM

## 2025-08-25 PROCEDURE — G8427 DOCREV CUR MEDS BY ELIG CLIN: HCPCS | Performed by: STUDENT IN AN ORGANIZED HEALTH CARE EDUCATION/TRAINING PROGRAM

## 2025-08-25 PROCEDURE — 99204 OFFICE O/P NEW MOD 45 MIN: CPT | Performed by: STUDENT IN AN ORGANIZED HEALTH CARE EDUCATION/TRAINING PROGRAM

## 2025-08-25 PROCEDURE — 3079F DIAST BP 80-89 MM HG: CPT | Performed by: STUDENT IN AN ORGANIZED HEALTH CARE EDUCATION/TRAINING PROGRAM

## 2025-08-25 RX ORDER — BISACODYL 5 MG
TABLET, DELAYED RELEASE (ENTERIC COATED) ORAL
Qty: 4 TABLET | Refills: 0 | Status: SHIPPED | OUTPATIENT
Start: 2025-08-25

## 2025-08-25 RX ORDER — POLYETHYLENE GLYCOL 3350, SODIUM CHLORIDE, SODIUM BICARBONATE, POTASSIUM CHLORIDE 420; 11.2; 5.72; 1.48 G/4L; G/4L; G/4L; G/4L
POWDER, FOR SOLUTION ORAL
Qty: 4000 ML | Refills: 0 | Status: SHIPPED | OUTPATIENT
Start: 2025-08-25

## 2025-09-05 DIAGNOSIS — E03.9 ACQUIRED HYPOTHYROIDISM: ICD-10-CM

## 2025-09-05 DIAGNOSIS — I10 ESSENTIAL HYPERTENSION: ICD-10-CM

## 2025-09-07 RX ORDER — NEBIVOLOL 20 MG/1
1 TABLET ORAL EVERY MORNING
Qty: 90 TABLET | Refills: 1 | Status: SHIPPED | OUTPATIENT
Start: 2025-09-07

## 2025-09-07 RX ORDER — LEVOTHYROXINE SODIUM 75 MCG
75 TABLET ORAL EVERY MORNING
Qty: 90 TABLET | Refills: 1 | Status: SHIPPED | OUTPATIENT
Start: 2025-09-07

## (undated) DEVICE — ADAPTER CLEANING PORPOISE CLEANING

## (undated) DEVICE — Device: Brand: DEFENDO VALVE AND CONNECTOR KIT

## (undated) DEVICE — PERRYSBURG ENDO PACK: Brand: MEDLINE INDUSTRIES, INC.

## (undated) DEVICE — GLOVE ORANGE PI 7   MSG9070

## (undated) DEVICE — SNARE ENDOSCP POLYP LG 2.4 MM 240 CM 30 MM 2.8 MM CAPTIVATOR

## (undated) DEVICE — POLYP TRAP: Brand: TRAPEASE®

## (undated) DEVICE — RETRIEVAL DEVICE: Brand: RESCUENET™

## (undated) DEVICE — ERBE NESSY®PLATE 170 SPLIT; 168CM²; CABLE 3M: Brand: ERBE

## (undated) DEVICE — SNARE ENDOSCP L240CM SHTH DIA2.4MM LOOP W20MM MIN WRK CHN